# Patient Record
Sex: FEMALE | Race: WHITE
[De-identification: names, ages, dates, MRNs, and addresses within clinical notes are randomized per-mention and may not be internally consistent; named-entity substitution may affect disease eponyms.]

---

## 2019-01-07 ENCOUNTER — HOSPITAL ENCOUNTER (INPATIENT)
Dept: HOSPITAL 97 - ER | Age: 44
LOS: 4 days | Discharge: HOME HEALTH | DRG: 690 | End: 2019-01-11
Attending: FAMILY MEDICINE | Admitting: INTERNAL MEDICINE
Payer: COMMERCIAL

## 2019-01-07 VITALS — BODY MASS INDEX: 30.6 KG/M2

## 2019-01-07 DIAGNOSIS — Z16.12: ICD-10-CM

## 2019-01-07 DIAGNOSIS — E03.9: ICD-10-CM

## 2019-01-07 DIAGNOSIS — K21.9: ICD-10-CM

## 2019-01-07 DIAGNOSIS — F17.210: ICD-10-CM

## 2019-01-07 DIAGNOSIS — B96.20: ICD-10-CM

## 2019-01-07 DIAGNOSIS — N13.4: ICD-10-CM

## 2019-01-07 DIAGNOSIS — Z88.5: ICD-10-CM

## 2019-01-07 DIAGNOSIS — J30.9: ICD-10-CM

## 2019-01-07 DIAGNOSIS — N10: Primary | ICD-10-CM

## 2019-01-07 DIAGNOSIS — G43.909: ICD-10-CM

## 2019-01-07 LAB
BUN BLD-MCNC: 13 MG/DL (ref 7–18)
GLUCOSE SERPLBLD-MCNC: 115 MG/DL (ref 74–106)
HCT VFR BLD CALC: 44.4 % (ref 36–45)
LYMPHOCYTES # SPEC AUTO: 2.8 K/UL (ref 0.7–4.9)
PMV BLD: 9 FL (ref 7.6–11.3)
POTASSIUM SERPL-SCNC: 3.8 MMOL/L (ref 3.5–5.1)
RBC # BLD: 4.77 M/UL (ref 3.86–4.86)

## 2019-01-07 PROCEDURE — 81003 URINALYSIS AUTO W/O SCOPE: CPT

## 2019-01-07 PROCEDURE — 87088 URINE BACTERIA CULTURE: CPT

## 2019-01-07 PROCEDURE — 99285 EMERGENCY DEPT VISIT HI MDM: CPT

## 2019-01-07 PROCEDURE — 78708 K FLOW/FUNCT IMAGE W/DRUG: CPT

## 2019-01-07 PROCEDURE — 36415 COLL VENOUS BLD VENIPUNCTURE: CPT

## 2019-01-07 PROCEDURE — 96375 TX/PRO/DX INJ NEW DRUG ADDON: CPT

## 2019-01-07 PROCEDURE — 87077 CULTURE AEROBIC IDENTIFY: CPT

## 2019-01-07 PROCEDURE — 71045 X-RAY EXAM CHEST 1 VIEW: CPT

## 2019-01-07 PROCEDURE — 87086 URINE CULTURE/COLONY COUNT: CPT

## 2019-01-07 PROCEDURE — 81025 URINE PREGNANCY TEST: CPT

## 2019-01-07 PROCEDURE — 80076 HEPATIC FUNCTION PANEL: CPT

## 2019-01-07 PROCEDURE — 87186 SC STD MICRODIL/AGAR DIL: CPT

## 2019-01-07 PROCEDURE — 83735 ASSAY OF MAGNESIUM: CPT

## 2019-01-07 PROCEDURE — 81015 MICROSCOPIC EXAM OF URINE: CPT

## 2019-01-07 PROCEDURE — 85025 COMPLETE CBC W/AUTO DIFF WBC: CPT

## 2019-01-07 PROCEDURE — 83690 ASSAY OF LIPASE: CPT

## 2019-01-07 PROCEDURE — 74177 CT ABD & PELVIS W/CONTRAST: CPT

## 2019-01-07 PROCEDURE — 80048 BASIC METABOLIC PNL TOTAL CA: CPT

## 2019-01-07 PROCEDURE — 96374 THER/PROPH/DIAG INJ IV PUSH: CPT

## 2019-01-07 NOTE — XMS REPORT
Clinical Summary

 Created on:2019



Patient:Velma Juarez

Sex:Female

:1975

External Reference #:[id number]





Demographics







 Address    [address]



   [place] [address]

 

 Home Phone  [phone]

 

 Email Address  [email or web address]

 

 Preferred Language  English

 

 Marital Status  

 

 Faith Affiliation  Unknown

 

 Race  White

 

 Ethnic Group  Not  or 









Author







 Organization  [facility]

 

 Address  [address] [place] [address]









Support







 Name  Relationship  Address  Phone

 

 Solitario Juarez  Spouse     [phone]



     [place] [address]  









Care Team Providers







 Name  Role  Phone

 

 Asked, No Pcp  Primary Care Provider  Unavailable









Allergies

Not on File



Medications







 Medication  Sig  Dispensed  Refills  Start Date  End Date  Status

 

 cyclobenzaprine  Take 10 mg by    3  2016    Active



 (FLEXERIL) 10 mg tablet  mouth 3 (three)          



   times a day as          



   needed.          

 

 acetaminophen-codeine  Take 1 tablet    0  10/10/2016    Active



 (TYLENOL WITH CODEINE  by mouth every          



 #3) 300-30 mg per  6 (six) hours          



 tablet  as needed. for          



   pain          

 

 docusate sodium  1 CAPSULE AS    0  10/04/2016    Active



 (COLACE) 100 MG capsule  NEEDED ONCE A          



   DAY ORALLY 30          



   DAY(S)          

 

 HYDROcodone-acetaminoph  Take 1 tablet    0  2016    Active



 en (NORCO)  mg  by mouth every          



 per tablet  6 (six) hours          



   as needed. for          



   pain          

 

 omeprazole (PriLOSEC)  Take 40 mg by    0  10/10/2016    Active



 40 MG capsule  mouth once          



   daily.          

 

 QSYMIA 7.5-46 mg  Take 1 tablet    2  2016    Active



 capsule, ER multiphase  by mouth once          



 24 hr  daily.          

 

 tiZANidine (ZANAFLEX) 4  Take 4 mg by    0  10/10/2016    Active



 MG tablet  mouth every 12          



   (twelve) hours.          

 

 venlafaxine XR  Take 37.5 mg by    2  10/28/2016    Active



 (EFFEXOR-XR) 37.5 MG 24  mouth once          



 hr capsule  daily.          

 

 gabapentin (NEURONTIN)  TAKE 2 CAPSULES  200 capsule  1  2017    Active



 300 mg capsule  BY MOUTH 3          



   TIMES A DAY          







Active Problems







 Problem  Noted Date

 

 Brachial neuritis  2016

 

 Abnormal finding of trunk  2016

 

 Chronic bilateral low back pain without sciatica  2016

 

 Chronic pain syndrome  2016







Encounters







 Date  Type  Specialty  Care Team  Description

 

 2018  Refill  Neurosurgery  Jerzy Aburto MD  



after 2018



Social History







 Tobacco Use  Types  Packs/Day  Years Used  Date

 

 Never Assessed        









 Sex Assigned at Birth  Date Recorded

 

 Not on file  









 Job Start Date  Occupation  Industry

 

 Not on file  Not on file  Not on file









 Travel History  Travel Start  Travel End









 No recent travel history available.







Last Filed Vital Signs

Not on file



Plan of Treatment







 Health Maintenance  Due Date  Last Done  Comments

 

 CERVICAL CANCER SCREENING  10/15/1994    

 

 INFLUENZA VACCINE  2018    







Results

Not on fileafter 2018



Insurance







 Payer  Benefit Plan / Group  Subscriber ID  Type  Phone  Address

 

 [employer]  [employer] HMO,POS,EPO, [employer]  xxxxxxxxx  HMO    









 Guarantor Name  Account Type  Relation to  Date of Birth  Phone  Billing



     Patient      Address

 

 Velma Juarez  Personal/Family  Self  1975  [phone]  [phone] 







         (Home)  [place]



           [address]







Advance Directives

Patient has advance care planning documents on file. For more information, 
please contact:[place] [address]

## 2019-01-08 LAB
ALBUMIN SERPL BCP-MCNC: 4.4 G/DL (ref 3.4–5)
ALP SERPL-CCNC: 76 U/L (ref 45–117)
ALT SERPL W P-5'-P-CCNC: 65 U/L (ref 12–78)
AST SERPL W P-5'-P-CCNC: 85 U/L (ref 15–37)
LIPASE SERPL-CCNC: 81 U/L (ref 73–393)
UA COMPLETE W REFLEX CULTURE PNL UR: (no result)
UA COMPLETE W REFLEX CULTURE PNL UR: (no result)
UA DIPSTICK W REFLEX MICRO PNL UR: (no result)

## 2019-01-08 RX ADMIN — OXYCODONE AND ACETAMINOPHEN PRN TAB: 5; 325 TABLET ORAL at 21:24

## 2019-01-08 RX ADMIN — Medication SCH ML: at 09:01

## 2019-01-08 RX ADMIN — SODIUM CHLORIDE SCH: 0.9 INJECTION, SOLUTION INTRAVENOUS at 17:42

## 2019-01-08 RX ADMIN — SODIUM CHLORIDE SCH MLS: 0.9 INJECTION, SOLUTION INTRAVENOUS at 20:58

## 2019-01-08 RX ADMIN — ONDANSETRON PRN MG: 2 INJECTION INTRAMUSCULAR; INTRAVENOUS at 17:41

## 2019-01-08 RX ADMIN — Medication SCH ML: at 20:58

## 2019-01-08 RX ADMIN — ONDANSETRON PRN MG: 2 INJECTION INTRAMUSCULAR; INTRAVENOUS at 20:58

## 2019-01-08 RX ADMIN — TRAMADOL HYDROCHLORIDE PRN MG: 50 TABLET, COATED ORAL at 13:12

## 2019-01-08 RX ADMIN — TRAMADOL HYDROCHLORIDE PRN MG: 50 TABLET, COATED ORAL at 19:40

## 2019-01-08 RX ADMIN — LEVOFLOXACIN SCH MLS: 5 INJECTION, SOLUTION INTRAVENOUS at 08:54

## 2019-01-08 RX ADMIN — ONDANSETRON PRN MG: 2 INJECTION INTRAMUSCULAR; INTRAVENOUS at 10:43

## 2019-01-08 RX ADMIN — SODIUM CHLORIDE SCH MLS: 0.9 INJECTION, SOLUTION INTRAVENOUS at 08:54

## 2019-01-08 NOTE — P.HP
Certification for Inpatient


Patient admitted to: Inpatient


With expected LOS: >2 Midnights


Practitioner: I am a practitioner with admitting privileges, knowledge of 

patient current condition, hospital course, and medical plan of care.


Services: Services provided to patient in accordance with Admission 

requirements found in Title 42 Section 412.3 of the Code of Federal Regulations





Patient History


Date of Service: 01/08/19


Reason for admission: UTI, hydroureter


History of Present Illness: 





Ms Juarez is a 43 years old woman who start about 4 days ago with back pain, she 

went to chiropractic, but did not have relived. Then the pain localized in her 

right lower quadrant radiated to her right flank, 10/10 of intensity, colicky 

like. She denied fever or chills. She has never had this pain in the past. Lab 

work remarkable for leukocytosis, UA abnormal consistent with UTI. CT abdo/

pelis shows not obstruction or stones but right hydroureter. 


Allergies





No Known Drug Allergies Allergy (Unverified 09/18/15 17:14)


 Unknown


morphine Adverse Reaction (Mild, Verified 09/15/12 14:30)


 Nausea/Vomiting


NKDA Allergy (Uncoded 05/13/14 19:55)


 Unknown


No Known Nicolas Allergy (Uncoded 07/12/16 15:26)


 Unknown


No Known Allergie Allergy (Uncoded 02/06/17 07:10)


 Unknown





Home medications list reviewed: Yes


Home Medications: 








Docusate [Colace] 100 mg PO BID #60 cap 09/18/12 


Doxycycline [Vibramycin*] 100 mg PO Q12HR #28 tab 09/18/12 


Mupirocin Oint [Bactroban 2% Ointment] 22 appl TOP BID #1 tube 09/18/12 


Sulfamethoxazole/Trimethoprim [Bactrim Ds Tablet] 1 each PO Q12HR #28 tablet 09/ 18/12 


Tramadol HCl/Acetaminophen [Ultracet Tablet] 1 each PO Q4HP PRN #40 tablet 09/18 /12 








- Past Medical/Surgical History


-: migraines


Past Surgical History: Reviewed- Non-Contributory





- Family History


Family History: Reviewed- Non-Contributory





- Social History


Smoking Status: Current some day smoker


Counseled patient to stop smoking for: less than 10 minutes


Alcohol use: No


CD- Drugs: No


Caffeine use: Yes


Place of Residence: Home





Review of Systems


10-point ROS is otherwise unremarkable





Physical Examination





- Physical Exam


General: Alert, In no apparent distress


HEENT: Atraumatic, PERRLA, Mucous membr. moist/pink, EOMI, Sclerae nonicteric


Neck: Supple, 2+ carotid pulse no bruit, No LAD, Without JVD or thyroid 

abnormality


Respiratory: Clear to auscultation bilaterally, Normal air movement


Cardiovascular: Regular rate/rhythm, Normal S1 S2


Gastrointestinal: Normal bowel sounds, Tenderness (teneder to palpation right 

flank and right lowr quadrant)


Musculoskeletal: No tenderness


Integumentary: No rashes


Neurological: Normal speech, Normal strength at 5/5 x4 extr, Normal tone, 

Normal affect


Lymphatics: No axilla or inguinal lymphadenopathy





- Studies


Laboratory Data (last 24 hrs)





01/07/19 23:25: Total Bilirubin 0.3, AST 85 H, ALT 65, Alkaline Phosphatase 76, 

Lipase 81


01/07/19 23:25: Sodium 144, Potassium 3.8, BUN 13, Creatinine 0.84, Glucose 115 

H


01/07/19 23:25: WBC 13.0 H, Hgb 14.6, Hct 44.4, Plt Count 264








Assessment and Plan





- Problems (Diagnosis)


(1) UTI (urinary tract infection)


Current Visit: Yes   Status: Acute   


Qualifiers: 


   Urinary tract infection type: acute pyelonephritis   Qualified Code(s): N10 

- Acute pyelonephritis   





(2) Hydroureter


Current Visit: Yes   Status: Acute   





- Plan





Will admit the patient to start IV antibiotics, Urine culture in process. Will 

consult Dr Martins for evaluation and recommendations.





- Advance Directives


Does patient have a Living Will: No


Does patient have a Durable POA for Healthcare: No





- Code Status/Comfort Care


Code Status Assessed: Yes


Code Status: Full Code

## 2019-01-08 NOTE — ER
Nurse's Notes                                                                                     

 [facility]                                                                

Name: Velma Juarez                                                                                  

Age: 43 yrs                                                                                       

Sex: Female                                                                                       

: 1975                                                                                   

MRN: [id number]                                                                                   

Arrival Date: 2019                                                                          

Time: 23:03                                                                                       

Account#: [id number]                                                                            

Bed 24                                                                                            

Private MD:                                                                                       

Diagnosis: Acute tubulo-interstitial nephritis;Hydroureter;Cystitis;Vomiting;Abdominal tenderness 

                                                                                                  

Presentation:                                                                                     

                                                                                             

23:09 Presenting complaint: Patient states: I have had lower abd pain for a couple days with  la1 

      urinary frequency that has gotten worse and is coming around to the front now.              

      Transition of care: patient was not received from another setting of care. Onset of         

      symptoms was 2019. Risk Assessment: Do you want to hurt yourself or someone     

      else? Patient reports no desire to harm self or others. Initial Sepsis Screen: Does the     

      patient meet any 2 criteria? No. Patient's initial sepsis screen is negative. Does the      

      patient have a suspected source of infection? No. Patient's initial sepsis screen is        

      negative. Care prior to arrival: None.                                                      

23:09 Method Of Arrival: Ambulatory                                                           la1 

23:09 Acuity: MAGALIS 3                                                                           la1 

                                                                                                  

OB/GYN:                                                                                           

23:35 LMP N/A - Hysterectomy                                                                  la1 

                                                                                                  

Historical:                                                                                       

- Allergies:                                                                                      

23:10 No Known Allergies;                                                                     la1 

- Home Meds:                                                                                      

                                                                                             

06:35 omeprazole 20 mg Oral cpDR 1 cap once daily [Active]; Zanaflex Oral [Active];           bb  

      Tylenol-Codeine #4 300-60 mg Oral tab [Active]; claritan [Active]; tizanidine oral oral     

      [Active]; [place] Thyroid 30 mg Oral tab daily [Active]; tumeric [Active]; Vitamin C         

      Oral [Active]; Vitamin E Oral [Active]; Fish Oil oral oral [Active];                        

- PMHx:                                                                                           

                                                                                             

23:10 Migraines;                                                                              la1 

                                                                                             

06:35 Hypothyroidism; GERD;                                                                   bb  

- PSHx:                                                                                           

06:35 partical hysterectomy; Anterior lumbar fusion;                                          bb  

                                                                                                  

- Immunization history:: Adult Immunizations up to date.                                          

- Social history:: Smoking status: Patient/guardian denies using tobacco.                         

- Ebola Screening: : No symptoms or risks identified at this time.                                

                                                                                                  

                                                                                                  

Screenin/07                                                                                             

23:38 Abuse screen: Denies threats or abuse. Nutritional screening: No deficits noted.        la1 

      Tuberculosis screening: No symptoms or risk factors identified. Fall Risk None              

      identified.                                                                                 

                                                                                                  

Assessment:                                                                                       

23:36 General: Appears uncomfortable, Behavior is calm, cooperative. Pain: Complains of pain  la1 

      in posterior aspect of right lateral abdomen, anterior aspect of right lateral abdomen      

      and right lower quadrant. Neuro: Level of Consciousness is awake, alert, obeys              

      commands, Oriented to person, place, time, situation. Cardiovascular: Capillary refill      

      < 3 seconds Patient's skin is warm and dry. Respiratory: Airway is patent Respiratory       

      effort is even, unlabored, Respiratory pattern is regular, symmetrical, Breath sounds       

      are clear bilaterally. GI: Abdomen is round non-distended, Bowel sounds present X 4         

      quads. Abd is soft X 4 quads Abdomen is tender to palpation in right lower quadrant         

      Reports nausea, vomiting. : Reports urinary frequency.                                    

                                                                                             

00:29 Reassessment: Patient appears in no apparent distress at this time. Pt done with oral   la1 

      contrast, CT notified.                                                                      

00:47 Reassessment: Patient appears in no apparent distress at this time. No changes from     la1 

      previously documented assessment. Patient and/or family updated on plan of care and         

      expected duration. Pain level reassessed. Patient is alert, oriented x 3, equal             

      unlabored respirations, skin warm/dry/pink.                                                 

02:35 Reassessment: Patient and/or family updated on plan of care and expected duration. Pain bb  

      level reassessed. Patient is alert, oriented x 3, equal unlabored respirations, skin        

      warm/dry/pink. pt returned from CT scan via wheelchair with CT tech.                        

02:44 Reassessment: pt ambulated with steady gait to bathroom states pain is starting to come bb  

      back Dr Corona notified.                                                                  

03:48 Reassessment: Patient is alert, oriented x 3, equal unlabored respirations, skin        bb  

      warm/dry/pink. pt is awaiting CT scan.                                                      

04:50 Reassessment: Patient and/or family updated on plan of care and expected duration. Pain bb  

      level reassessed. Patient is alert, oriented x 3, equal unlabored respirations, skin        

      warm/dry/pink. pt instructed on need for admission and she verbalized understanding of      

      and agrees to plan of care awaiting for room assignment Patient states feeling better.      

06:04 Reassessment: Patient is alert, oriented x 3, equal unlabored respirations, skin        bb  

      warm/dry/pink. pt states pain is coming back but is only 4/10 now IV site intact, no        

      erythema or edema noted awaiting room assignment.                                           

07:19 Reassessment: Pt is resting at this time. Eyes closed, respirations remain even and     ss  

      unlabored. Awaiting for 730 to call report per facility policy. VS WNL.                    

07:33 Reassessment: report given to JORGE A Langfrod.                                                ss  

                                                                                                  

Vital Signs:                                                                                      

                                                                                             

23:35  / 64; Pulse 84; Resp 16; Temp 98.7; Pulse Ox 98% on R/A; Weight 79.38 kg; Height la1 

      5 ft. 5 in. (165.10 cm);                                                                    

                                                                                             

00:46  / 63; Pulse 70; Resp 18; Pulse Ox 98% on R/A;                                    la1 

02:44  / 59; Pulse 71; Resp 16 S; Temp 98.3(O); Pulse Ox 99% on R/A; Pain 5/10;         bb  

04:50  / 62; Pulse 66; Resp 16 S; Pulse Ox 100% on R/A; Pain 2/10;                      bb  

06:06 BP 99 / 54; Pulse 70; Resp 14 S; Pulse Ox 97% on R/A; Pain 4/10;                        bb  

                                                                                             

23:35 Body Mass Index 29.12 (79.38 kg, 165.10 cm)                                             la1 

                                                                                                  

ED Course:                                                                                        

                                                                                             

23:03 Patient arrived in ED.                                                                  es  

23:07 [facility]                                                        kb  

23:07 Tremaine Corona MD is Attending Physician.                                             kb  

23:09 Johnnie Georges, RN is Primary Nurse.                                                       la1 

23:10 Triage completed.                                                                       la1 

23:10 Arm band placed on left wrist.                                                          la1 

23:37 Call light in reach.                                                                    la1 

23:37 Inserted saline lock: 22 gauge in right antecubital area, using aseptic technique.      la1 

      Blood collected.                                                                            

                                                                                             

02:16 Patient moved to CT via wheelchair.                                                     kw1 

02:27 CT completed. Patient tolerated procedure well. Patient moved back from CT.             kw1 

02:42 CT Abd/Pelvis - W/Contrast In Process Unspecified.                                      EDMS

02:43 CT completed. Patient tolerated procedure well. Patient moved back from CT.             kw1 

02:45 IV is patent, is intact.                                                                bb  

04:45 Ashley Mistry MD is Hospitalizing Provider.                                          jermaine 

04:51 No provider procedures requiring assistance completed. Patient admitted, IV remains in  bb  

      place.                                                                                      

                                                                                                  

Administered Medications:                                                                         

                                                                                             

23:34 Drug: NS 0.9% 1000 ml Route: IV; Rate: 1000 ml; Site: right antecubital;                la1 

23:54 Follow up: IV Status: Completed infusion                                                la1 

23:34 Drug: morphine 4 mg Route: IVP; Site: right antecubital;                                la1 

23:53 Follow up: Response: No adverse reaction; Pain is decreased                             la1 

23:35 Drug: Zofran 4 mg Route: IVP; Site: right antecubital;                                  la1 

23:54 Follow up: Response: No adverse reaction                                                la1 

23:53 Drug: fentaNYL (PF) 50 mcg Route: IVP; Site: right antecubital;                         la1 

                                                                                             

00:07 Follow up: Response: No adverse reaction; Pain is decreased                             la1 

00:00 Drug: TORadol 30 mg Route: IVP; Site: right antecubital;                                la1 

00:07 Follow up: Response: No adverse reaction; Pain is decreased                             la1 

03:15 Drug: fentaNYL (PF) 50 mcg Route: IVP; Site: right antecubital;                         bb  

04:14 Follow up: Response: Pain is decreased                                                  bb  

04:40 Not Given (Other Intervention Used): Rocephin - (cefTRIAXone) 1 grams IVPB once over 30 bb  

      mins; (mix in 50 mL NS)                                                                     

04:41 Drug: Rocephin 1 grams Route: IV; Rate: calculated rate; Site: right antecubital;       bb  

04:46 Follow up: IV Status: Completed infusion; IV Intake: 10ml                               bb  

04:49 Drug: Flomax 0.4 mg Route: PO;                                                          bb  

05:50 Follow up: Response: No adverse reaction                                                bb  

                                                                                                  

                                                                                                  

Intake:                                                                                           

04:46 IV: 10ml; Total: 10ml.                                                                  bb  

                                                                                                  

Outcome:                                                                                          

04:47 Decision to Hospitalize by Provider.                                                    jermaine 

04:52 Instructed on the need for admit.                                                       bb  

07:44 Admitted to Med/surg accompanied by tech, family with patient, via wheelchair, with     ss  

      chart, Report called to  JORGE A Langford                                                          

07:44 Condition: good                                                                             

07:45 Patient left the ED.                                                                    ss  

                                                                                                  

Signatures:                                                                                       

Dispatcher [employer]                           Jayla Swanson, KATIA-C                 MOISÉSP-Tremaine Doe MD MD cha Salyer, Edna es                                                   

Kirk, Sejal, RN                     RN   bb                                                   

Silvia Ledesma RN                      RN   ss                                                   

Johnnie Georges RN                         RN   la                                                  

Elidia Huang                                                  

                                                                                                  

**************************************************************************************************

## 2019-01-08 NOTE — CON
History Of Present Illness:  This is a pleasant 43-year-old woman who started 
to have back pains 4 days ago with some radiation to the right lower quadrant.  
She thought she had a UTI.  She normally has about 1 UTI per year.  She tried 
treating with AZO Standard but did not improve.  When it got worse, she went to 
a chiropractor.  Did some adjustments but adjustments did not work either, so 
she came to the emergency room.  She denies ever having any previous kidney 
stones.  The pain was, at its height, it was 20/10, now it is a 5/10.  She has 
no appetite.  She is still nauseous.  Internally, she feels like she has a 
temperature but her temperature is normal when it is checked.  She had previous 
area in the lumbar area, L4, L5, S1 area and this pain does not feel like that 
type of pain.  On the CAT scan, her bladder was full, so will need to check a 
postvoid residual.  Also reviewed the CAT scan showing proximal hydroureter and 
distal hydroureter on the right as if she recently passed a stone.  UA shows 3+ 
blood, 3+ leukocyte esterase, greater than 50 rbc's, greater than 50 wbc's, 
squamous cells less than 5, bacteria 20-50.  Pregnancy test is negative.  Total 
protein 2+.



Allergies:  TO MORPHINE, CAUSES NAUSEA AND VOMITING.



Home Medications:  Colace, Vibramycin, Bactroban ointment, Bactrim DS 1 tab 
q.12 hours, tramadol.



Past Medical History:  Migraines.



Past Surgical History:  Back surgery as mentioned above.



Family History:  Noncontributory.



Social History:  Current smoker.  We counseled her to stop.  Alcohol none.  
Drugs none.  Caffeine use, yes.  She resides at home.



Review of Systems:

A 10-point review of systems otherwise unremarkable.



Physical Examination:

She is alert and oriented. 

HEENT:  Atraumatic, normocephalic. 

Neck:  Supple. 

Respiratory:  Clear to auscultation. 

Cardiovascular:  Normal S1, S2. 

Gastrointestinal:  Normal bowel sounds.  Tenderness. 

Musculoskeletal:  No tenderness. 

Skin:  No rashes. 

Neurologic:  Normal speech.  Normal strength.  Normal affect.  

Lymphatics:  Normal.



Laboratory Data:  UA as mentioned above.  Chemistry shows sodium 144, potassium 
3.9, chloride 107, carbon dioxide 28, BUN 13, creatinine 0.84, GFR 73, glucose 
115, calcium 9.2, total bilirubin 0.3, direct bilirubin 0.1, AST 85, ALT 65, 
alkaline phosphatase 76.  Hematology; white count elevated 13.0, H and H 14.6 
and 44.4, platelet count 264.



Assessment:  Right flank pain, proximal right hydroureter and distal 
hydroureter.  Urinalysis appears to resemble urinary tract infection picture.  
She does have some pain.  She may have some pyelonephritis going on.  She may 
have a distended bladder.



Plan:  Plan is to go ahead and check postvoid residual.  We will also go ahead 
and check renal scan in the morning to assess for any obstructions that may be 
in the right ureter.  I recommend continuing antibiotics, continue to check for 
final results on the culture, which may possible have a UTI or possible 
something may be obstructing the ureter.  I will follow her with you.





SHAWN

DD:  01/08/2019 12:31:55   Voice ID:  [id number]

DT:  01/08/2019 17:41:09   Report ID:  [id number]

KATY

## 2019-01-08 NOTE — EDPHYS
Physician Documentation                                                                           

 [facility]                                                                

Name: Velma Juarez                                                                                  

Age: 43 yrs                                                                                       

Sex: Female                                                                                       

: 1975                                                                                   

MRN: [id number]                                                                                   

Arrival Date: 2019                                                                          

Time: 23:03                                                                                       

Account#: [id number]                                                                            

Bed 24                                                                                            

Private MD:                                                                                       

ED Physician Tremaine Corona                                                                      

HPI:                                                                                              

                                                                                             

23:46 This 43 yrs old  Female presents to ER via Ambulatory with complaints of Flank kb  

      Pain, Back Pain, Nausea/Vomiting.                                                           

23:46 The patient complains of pain in the right flank. The pain radiates to the right lower  kb  

      quadrant. Onset: The symptoms/episode began/occurred 3 day(s) ago. Modifying factors:       

      The symptoms are alleviated by nothing. the symptoms are aggravated by                      

      palpation/percussion. Associated signs and symptoms: Pertinent positives: nausea,           

      vomiting, Pertinent negatives: diarrhea, dizziness, dysuria, fever, urinary frequency,      

      headache, hematuria, pain radiating to the lower extremities. Severity of pain: At its      

      worst the pain was moderate severe in the emergency department the pain is unchanged.       

      The patient has not experienced similar symptoms in the past. The patient has not           

      recently seen a physician.                                                                  

                                                                                                  

OB/GYN:                                                                                           

23:35 LMP N/A - Hysterectomy                                                                  la1 

                                                                                                  

Historical:                                                                                       

- Allergies:                                                                                      

23:10 No Known Allergies;                                                                     la1 

- Home Meds:                                                                                      

                                                                                             

06:35 omeprazole 20 mg Oral cpDR 1 cap once daily [Active]; Zanaflex Oral [Active];           bb  

      Tylenol-Codeine #4 300-60 mg Oral tab [Active]; claritan [Active]; tizanidine oral oral     

      [Active]; Red House Thyroid 30 mg Oral tab daily [Active]; tumeric [Active]; Vitamin C         

      Oral [Active]; Vitamin E Oral [Active]; Fish Oil oral oral [Active];                        

- PMHx:                                                                                           

                                                                                             

23:10 Migraines;                                                                              la1 

                                                                                             

06:35 Hypothyroidism; GERD;                                                                   bb  

- PSHx:                                                                                           

06:35 partical hysterectomy; Anterior lumbar fusion;                                          bb  

                                                                                                  

- Immunization history:: Adult Immunizations up to date.                                          

- Social history:: Smoking status: Patient/guardian denies using tobacco.                         

- Ebola Screening: : No symptoms or risks identified at this time.                                

                                                                                                  

                                                                                                  

ROS:                                                                                              

                                                                                             

23:47 Constitutional: Negative for fever, chills, and weight loss, ENT: Negative for injury,  kb  

      pain, and discharge, Neck: Negative for injury, pain, and swelling, Cardiovascular:         

      Negative for chest pain, palpitations, and edema, Respiratory: Negative for shortness       

      of breath, cough, wheezing, and pleuritic chest pain, MS/Extremity: Negative for injury     

      and deformity, Skin: Negative for injury, rash, and discoloration, Neuro: Negative for      

      headache, weakness, numbness, tingling, and seizure.                                        

      Abdomen/GI: Positive for abdominal pain, nausea and vomiting.                               

      : Positive for flank pain.                                                                

                                                                                                  

Exam:                                                                                             

23:50 Head/Face:  Normocephalic, atraumatic. ENT:  Nares patent. No nasal discharge, no       kb  

      septal abnormalities noted.  Tympanic membranes are normal and external auditory canals     

      are clear.  Oropharynx with no redness, swelling, or masses, exudates, or evidence of       

      obstruction, uvula midline.  Mucous membranes moist. Neck:  Trachea midline, no             

      thyromegaly or masses palpated, and no cervical lymphadenopathy.  Supple, full range of     

      motion without nuchal rigidity, or vertebral point tenderness.  No Meningismus.             

      Chest/axilla:  Normal chest wall appearance and motion.  Nontender with no deformity.       

      No lesions are appreciated. Cardiovascular:  Regular rate and rhythm with a normal S1       

      and S2.  No gallops, murmurs, or rubs.  Normal PMI, no JVD.  No pulse deficits.             

      Respiratory:  Lungs have equal breath sounds bilaterally, clear to auscultation and         

      percussion.  No rales, rhonchi or wheezes noted.  No increased work of breathing, no        

      retractions or nasal flaring. Skin:  Warm, dry with normal turgor.  Normal color with       

      no rashes, no lesions, and no evidence of cellulitis. MS/ Extremity:  Pulses equal, no      

      cyanosis.  Neurovascular intact.  Full, normal range of motion. Neuro:  Awake and           

      alert, GCS 15, oriented to person, place, time, and situation.  Cranial nerves II-XII       

      grossly intact.  Motor strength 5/5 in all extremities.  Sensory grossly intact.            

      Cerebellar exam normal.  Normal gait.                                                       

23:50 Constitutional: The patient appears alert, awake, in obvious pain.                          

23:50 Abdomen/GI: Inspection: abdomen appears normal, Bowel sounds: normal, in all quadrants,     

      Palpation: soft, in all quadrants, moderate abdominal tenderness, in the right lower        

      quadrant.                                                                                   

23:50 Back: CVA tenderness, that is moderate, is noted on the right.                              

                                                                                                  

Vital Signs:                                                                                      

23:35  / 64; Pulse 84; Resp 16; Temp 98.7; Pulse Ox 98% on R/A; Weight 79.38 kg; Height la1 

      5 ft. 5 in. (165.10 cm);                                                                    

                                                                                             

00:46  / 63; Pulse 70; Resp 18; Pulse Ox 98% on R/A;                                    la1 

02:44  / 59; Pulse 71; Resp 16 S; Temp 98.3(O); Pulse Ox 99% on R/A; Pain 5/10;         bb  

04:50  / 62; Pulse 66; Resp 16 S; Pulse Ox 100% on R/A; Pain 2/10;                      bb  

06:06 BP 99 / 54; Pulse 70; Resp 14 S; Pulse Ox 97% on R/A; Pain 4/10;                        bb  

                                                                                             

23:35 Body Mass Index 29.12 (79.38 kg, 165.10 cm)                                             la1 

                                                                                                  

MDM:                                                                                              

                                                                                             

23:08 Patient medically screened.                                                             kb  

23:45 Data reviewed: vital signs, nurses notes. Data interpreted: Pulse oximetry: on room air kb  

      is 98 %. Interpretation: normal.                                                            

                                                                                                  

                                                                                             

23:18 Order name: CBC with Diff; Complete Time: 23:36                                         kb  

                                                                                             

23:18 Order name: Basic Metabolic Panel; Complete Time: 23:51                                 kb  

                                                                                             

23:18 Order name: Urine Microscopic Only; Complete Time: 15:05                                kb  

                                                                                             

04:00 Order name: LFT's; Complete Time: 04:20                                                 [facility] 

                                                                                             

04:00 Order name: Lipase; Complete Time: 04:20                                                [facility] 

                                                                                             

04:21 Order name: Urine Culture                                                               [facility] 

                                                                                             

23:36 Order name: CT Abd/Pelvis - W/Contrast; Complete Time: 15:05                            kb  

                                                                                             

04:25 Order name: Urine Dipstick--Ancillary (enter results); Complete Time: 04:39             ag4 

                                                                                             

04:25 Order name: Urine Pregnancy--Ancillary (enter results); Complete Time: 04:39            ag4 

                                                                                             

23:18 Order name: IV Start; Complete Time: 23:36                                              kb  

                                                                                             

23:18 Order name: Urine Pregnancy Test (obtain specimen); Complete Time: 23:36                kb  

                                                                                             

23:18 Order name: Urine Dipstick-Ancillary (obtain specimen); Complete Time: 23:53            kb  

                                                                                                  

Administered Medications:                                                                         

23:34 Drug: NS 0.9% 1000 ml Route: IV; Rate: 1000 ml; Site: right antecubital;                la1 

23:54 Follow up: IV Status: Completed infusion                                                la1 

23:34 Drug: morphine 4 mg Route: IVP; Site: right antecubital;                                la1 

23:53 Follow up: Response: No adverse reaction; Pain is decreased                             la1 

23:35 Drug: Zofran 4 mg Route: IVP; Site: right antecubital;                                  la1 

23:54 Follow up: Response: No adverse reaction                                                la1 

23:53 Drug: fentaNYL (PF) 50 mcg Route: IVP; Site: right antecubital;                         la1 

                                                                                             

00:07 Follow up: Response: No adverse reaction; Pain is decreased                             la1 

00:00 Drug: TORadol 30 mg Route: IVP; Site: right antecubital;                                la1 

00:07 Follow up: Response: No adverse reaction; Pain is decreased                             la1 

03:15 Drug: fentaNYL (PF) 50 mcg Route: IVP; Site: right antecubital;                         bb  

04:14 Follow up: Response: Pain is decreased                                                  bb  

04:40 Not Given (Other Intervention Used): Rocephin - (cefTRIAXone) 1 grams IVPB once over 30 bb  

      mins; (mix in 50 mL NS)                                                                     

04:41 Drug: Rocephin 1 grams Route: IV; Rate: calculated rate; Site: right antecubital;       bb  

04:46 Follow up: IV Status: Completed infusion; IV Intake: 10ml                               bb  

04:49 Drug: Flomax 0.4 mg Route: PO;                                                          bb  

05:50 Follow up: Response: No adverse reaction                                                bb  

                                                                                                  

                                                                                                  

Disposition:                                                                                      

04:00 Co-signature as Attending Physician, Tremaine Corona MD I agree with the assessment and  jermaine 

      plan of care.                                                                               

                                                                                                  

Disposition:                                                                                      

19 04:47 Hospitalization ordered by Ashley Mistry for Inpatient Admission. Preliminary    

  diagnosis are Acute tubulo-interstitial nephritis, Hydroureter, Cystitis,                       

  Vomiting, Abdominal tenderness.                                                                 

- Bed requested for Telemetry/MedSurg (observation).                                              

- Status is Inpatient Admission.                                                              ss  

- Condition is Fair.                                                                              

- Problem is new.                                                                                 

- Symptoms have improved.                                                                         

UTI on Admission? Yes                                                                             

                                                                                                  

                                                                                                  

                                                                                                  

Signatures:                                                                                       

Dispatcher [employer]                           Jayla Swanson FNP-C FNP-Ckb                                                   

Marcia Barger RN RN mw Anderson, Corey, MD MD cha Ballard, Brenda, RN RN bb Smirch, Shelby, RN RN ss Attema, Lee, RN RN   la1                                                  

                                                                                                  

Corrections: (The following items were deleted from the chart)                                    

 02:50 Head/Face: Normocephalic, atraumatic. ENT: Nares patent. No nasal discharge, no   kb  

      septal abnormalities noted. Tympanic membranes are normal and external auditory canals      

      are clear. Oropharynx with no redness, swelling, or masses, exudates, or evidence of        

      obstruction, uvula midline. Mucous membranes moist. Neck: Trachea midline, no               

      thyromegaly or masses palpated, and no cervical lymphadenopathy. Supple, full range of      

      motion without nuchal rigidity, or vertebral point tenderness. No Meningismus.              

      Chest/axilla: Normal chest wall appearance and motion. Nontender with no deformity. No      

      lesions are appreciated. Cardiovascular: Regular rate and rhythm with a normal S1 and       

      S2. No gallops, murmurs, or rubs. Normal PMI, no JVD. No pulse deficits. Respiratory:       

      Lungs have equal breath sounds bilaterally, clear to auscultation and percussion. No        

      rales, rhonchi or wheezes noted. No increased work of breathing, no retractions or          

      nasal flaring. Skin: Warm, dry with normal turgor. Normal color with no rashes, no          

      lesions, and no evidence of cellulitis. MS/ Extremity: Pulses equal, no cyanosis.           

      Neurovascular intact. Full, normal range of motion. Neuro: Awake and alert, GCS 15,         

      oriented to person, place, time, and situation. Cranial nerves II-XII grossly intact.       

      Motor strength 5/5 in all extremities. Sensory grossly intact. Cerebellar exam normal.      

      Normal gait. kb                                                                             

 02:50 Abdomen/GI: Inspection: abdomen appears normal, Bowel sounds: normal, in all      kb  

      quadrants, Palpation: soft, in all quadrants, moderate abdominal tenderness, in the         

      right lower quadrant, kb                                                                    

 02:50 Back: CVA tenderness, that is moderate, is noted on the right, kb                 kb  

 02:50 Constitutional: The patient appears alert, awake, in obvious pain, kb             kb  

05:24 04:47 Hospitalization Ordered by Ashley Mistry MD for Inpatient Admission. Preliminary   

      diagnosis is Acute tubulo-interstitial nephritis; Hydroureter; Cystitis; Vomiting;          

      Abdominal tenderness. Bed requested for Telemetry/MedSurg (observation). Status is          

      Inpatient Admission. Condition is Fair. Problem is new. Symptoms have improved. UTI on      

      Admission? Yes. [facility]                                                                         

07:45 05:24 2019 04:47 Hospitalization Ordered by Ashley Mistry MD for Inpatient       ss  

      Admission. Preliminary diagnosis is Acute tubulo-interstitial nephritis; Hydroureter;       

      Cystitis; Vomiting; Abdominal tenderness. Bed requested for Telemetry/MedSurg               

      (observation). Status is Inpatient Admission. Condition is Fair. Problem is new.            

      Symptoms have improved. UTI on Admission? Yes. mw                                           

                                                                                                  

**************************************************************************************************

## 2019-01-08 NOTE — RAD REPORT
EXAM DESCRIPTION:  CT - Abdomen   Pelvis W Contrast - 1/8/2019 5:53 am

 

CLINICAL HISTORY:   Abdominal pain. Lower abdominal pain with dysuria

 

COMPARISON:  2016

 

TECHNIQUE:  Computed axial tomography of the abdomen and pelvis was obtained. 100 cc Isovue-300 is ad
ministered intravenously. Oral contrast was given.Preliminary report generated by [employer] 
and reviewed prior to dictation

 

All CT scans are performed using dose optimization technique as appropriate and may include automated
 exposure control or mA/KV adjustment according to patient size.

 

FINDINGS:

The liver, spleen, pancreas, adrenals and kidneys appear unremarkable. Enhancement of the wall of the
 right ureter.

 

The appendix is not visualized. There is no evidence of diverticulitis

 

Postsurgical changes involve the lumbar spine moderate amount of stool within the colon

 

IMPRESSION:  Enhancement of the wall of the right ureter is a nonspecific finding but can be related 
to inflammation/ infection

 

Moderate amount of stool within the colon

## 2019-01-09 LAB
BUN BLD-MCNC: 6 MG/DL (ref 7–18)
GLUCOSE SERPLBLD-MCNC: 91 MG/DL (ref 74–106)
HCT VFR BLD CALC: 42 % (ref 36–45)
LYMPHOCYTES # SPEC AUTO: 1.9 K/UL (ref 0.7–4.9)
MAGNESIUM SERPL-MCNC: 2.1 MG/DL (ref 1.8–2.4)
PMV BLD: 9.6 FL (ref 7.6–11.3)
POTASSIUM SERPL-SCNC: 4 MMOL/L (ref 3.5–5.1)
RBC # BLD: 4.57 M/UL (ref 3.86–4.86)

## 2019-01-09 RX ADMIN — OXYCODONE AND ACETAMINOPHEN PRN TAB: 5; 325 TABLET ORAL at 19:58

## 2019-01-09 RX ADMIN — Medication SCH: at 19:59

## 2019-01-09 RX ADMIN — ONDANSETRON PRN MG: 2 INJECTION INTRAMUSCULAR; INTRAVENOUS at 08:29

## 2019-01-09 RX ADMIN — SODIUM CHLORIDE SCH MLS: 0.45 INJECTION, SOLUTION INTRAVENOUS at 17:16

## 2019-01-09 RX ADMIN — OXYCODONE AND ACETAMINOPHEN PRN TAB: 5; 325 TABLET ORAL at 10:16

## 2019-01-09 RX ADMIN — OXYCODONE AND ACETAMINOPHEN PRN TAB: 5; 325 TABLET ORAL at 15:09

## 2019-01-09 RX ADMIN — LEVOFLOXACIN SCH MLS: 5 INJECTION, SOLUTION INTRAVENOUS at 08:29

## 2019-01-09 RX ADMIN — SODIUM CHLORIDE SCH MLS: 0.9 INJECTION, SOLUTION INTRAVENOUS at 15:04

## 2019-01-09 RX ADMIN — Medication SCH: at 08:34

## 2019-01-09 RX ADMIN — OXYCODONE AND ACETAMINOPHEN PRN TAB: 5; 325 TABLET ORAL at 04:41

## 2019-01-09 NOTE — P.PN
Subjective


Date of Service: 01/09/19


Primary Care Provider: JAYANT varela


Chief Complaint: UTI, hydroureter


Subjective: Improving





Physical Examination





- Vital Signs


Temperature: 98.9 F


Blood Pressure: 96/51


Pulse: 72


Respirations: 16


Pulse Ox (%): 97





- Physical Exam


General: Alert, In no apparent distress, Oriented x3, Cooperative


HEENT: Atraumatic


Neck: Supple


Respiratory: Clear to auscultation bilaterally, Normal air movement


Cardiovascular: Normal pulses, Regular rate/rhythm


Gastrointestinal: Normal bowel sounds, Soft and benign, Non-distended, No masses

, No rebound, No guarding, Tenderness (Right flank pain improved)


Musculoskeletal: No erythema, No tenderness, No warmth


Integumentary: No tenderness/swelling, No erythema, No warmth, No cyanosis


Neurological: Normal speech, Normal strength at 5/5 x4 extr, Normal tone





- Studies


Medications List Reviewed: Yes





Assessment & Plan


Discharge Plan: Home


Plan to discharge in: 24 Hours


Physician Review Additional Text: 





Impression: 


Right Pyelonephritis without obstruction





Plan:


Patient doing well this time.  Patient with positive urine culture with Gram 

negative rods.  Await urine culture results.  Possible discharge later today if 

culture finalized.  Continue with medication.  Advance diet.  Encourage 

ambulation.  Will discuss with urology.


Time Spent Managing Pts Care (In Minutes): 55

## 2019-01-09 NOTE — RAD REPORT
EXAM DESCRIPTION:  NM - Kidney Imag W/Flow F W - 1/9/2019 6:40 am

 

CLINICAL HISTORY:  Right-sided obstruction

 

COMPARISON:  CT imaging January 8

 

TECHNIQUE:  Patient was administered 10.5 millicuries technetium 99 M Mag 3. A 40 milligram Lasix was
 given approximately 10 minutes after radiopharmaceutical administration.

 

FINDINGS:  Dynamic flow imaging was performed. Split function assessment was calculated. Time activit
y curves were generated.

 

Both kidneys show similar time activity curves. Time to peak activity on the left was 4.30 minutes. T
madelyn to peak activity on the right was 4.35 minutes. Time activity curves are normal. Split function i
s 46% left and 54% right.

 

IMPRESSION:  Normal Lasix renogram studies. Both kidneys demonstrate normal, symmetric time activity 
curves.

 

Time to peak activity on the left was 4.30 minutes. Time to peak activity on the right 4.35 minutes.

 

Split function was 46% left and 54% right.

## 2019-01-09 NOTE — PN
Subjective:  The patient's spouse is present in the room and today she is feeling a little bit better
.  She has some nausea.  Her renal scan was done this morning.



Objective:  Her white count is down from 13,000 to 7800.  Her GFR is up from 73% to 86%.  Her urine c
ulture is growing gram-negative rods.  She is currently on Levaquin.  Her renal nuclear scan shows th
at both kidneys are functioning well.  Good uptake and excretion.  No signs of obstruction.  Function
 was almost even.



Assessment:  Urinary tract infection, right hydroureter, possible a tinge of right pyelonephritis, ex
plaining her pain.  Continue Levaquin.  Await final urine culture and sensitivity, we will base her a
ntibiotics off this.  I would recommend at least 2 weeks of antibiotics at home for her since she may
 have possibility of pyelonephritis.





CHARLIE/KEV

DD:  01/09/2019 13:14:45Voice ID:  [id number]

DT:  01/09/2019 18:21:45Report ID:  [id number]

## 2019-01-10 PROCEDURE — 02HV33Z INSERTION OF INFUSION DEVICE INTO SUPERIOR VENA CAVA, PERCUTANEOUS APPROACH: ICD-10-PCS

## 2019-01-10 PROCEDURE — B548ZZA ULTRASONOGRAPHY OF SUPERIOR VENA CAVA, GUIDANCE: ICD-10-PCS

## 2019-01-10 RX ADMIN — SODIUM CHLORIDE SCH MLS: 9 INJECTION, SOLUTION INTRAVENOUS at 20:35

## 2019-01-10 RX ADMIN — SODIUM CHLORIDE SCH MLS: 9 INJECTION, SOLUTION INTRAVENOUS at 09:32

## 2019-01-10 RX ADMIN — OXYCODONE AND ACETAMINOPHEN PRN TAB: 5; 325 TABLET ORAL at 22:47

## 2019-01-10 RX ADMIN — OXYCODONE AND ACETAMINOPHEN PRN TAB: 5; 325 TABLET ORAL at 12:35

## 2019-01-10 RX ADMIN — SODIUM CHLORIDE SCH MLS: 0.45 INJECTION, SOLUTION INTRAVENOUS at 13:00

## 2019-01-10 RX ADMIN — SODIUM CHLORIDE SCH: 0.45 INJECTION, SOLUTION INTRAVENOUS at 03:00

## 2019-01-10 RX ADMIN — Medication SCH ML: at 20:35

## 2019-01-10 RX ADMIN — SODIUM CHLORIDE SCH MLS: 0.45 INJECTION, SOLUTION INTRAVENOUS at 05:36

## 2019-01-10 RX ADMIN — OXYCODONE AND ACETAMINOPHEN PRN TAB: 5; 325 TABLET ORAL at 05:35

## 2019-01-10 RX ADMIN — ONDANSETRON PRN MG: 2 INJECTION INTRAMUSCULAR; INTRAVENOUS at 03:08

## 2019-01-10 RX ADMIN — Medication SCH: at 09:00

## 2019-01-10 RX ADMIN — ONDANSETRON PRN MG: 2 INJECTION INTRAMUSCULAR; INTRAVENOUS at 22:47

## 2019-01-10 NOTE — PN
Subjective:  The patient is doing well.



Objective:  Urine culture came back as E. coli ESBL.  Sensitivity shows to 
Bactrim, tetracycline, nitrofurantoin, Augmentin, gentamicin, cefoxitin, 
cefotetan, Unasyn, Timentin, tobramycin, piptazobactam, ciprofloxacin, meropenem
, amikacin.  The patient's vital signs are stable.



Laboratory Data:  No new labs today.



Assessment:  Extended-spectrum beta-lactamase, right pyelonephritis.  Recommend 
14 days of IV meropenem twice a day.  This patient is healthy.  She has a nice 
place to live, new six pascual, and she is willing to learn how to do one dose 
of the antibiotics a day by herself, and the home health nurse does it once a 
day also for a total of twice a day.  I have asked the nurses to teach her how 
to do that.  I believe it would be safe to send her home.  She is very 
competent and is interested in learning how to do so and very motivated to go 
home. 



Thank you very much for this consultation.





SHAWN

DD:  01/10/2019 16:42:32   Voice ID:  [id number]

DT:  01/10/2019 22:22:12   Report ID:  [id number]

KATY

## 2019-01-10 NOTE — P.PN
Subjective


Date of Service: 01/10/19


Primary Care Provider: JAYANT varela


Chief Complaint: UTI, hydroureter


Subjective: Improving (Still with mild pain to the right flank)





Physical Examination





- Vital Signs


Temperature: 98.2 F


Blood Pressure: 111/81


Pulse: 69


Respirations: 17


Pulse Ox (%): 100





- Physical Exam


General: Alert, In no apparent distress, Oriented x3, Cooperative


HEENT: Atraumatic


Neck: Supple


Respiratory: Clear to auscultation bilaterally, Normal air movement


Cardiovascular: Normal pulses, Regular rate/rhythm


Gastrointestinal: Normal bowel sounds, Soft and benign, Non-distended, No masses

, No rebound, No guarding, Tenderness (Pain to the right flank improved)


Musculoskeletal: No erythema, No tenderness, No warmth


Integumentary: No tenderness/swelling, No erythema, No warmth, No cyanosis


Neurological: Normal speech, Normal strength at 5/5 x4 extr, Normal tone, 

Normal affect





- Studies


Microbiology Data (last 24 hrs): 








01/08/19 04:05   Clean Catch Urine   [place] Count - Final


                            >100,000 CFU/ML.


01/08/19 04:05   Clean Catch Urine    - Final


                            Escherichia Coli





Medications List Reviewed: Yes





Assessment & Plan


Discharge Plan: Other (Home with IV antibiotic therapy verses long-term acute 

care facility)


Plan to discharge in: 24 Hours


Physician Review Additional Text: 





Impression: 


Right Pyelonephritis without obstruction with positive urine culture for E coli-

ESBL





Plan:


Right Pyelonephritis without obstruction with positive urine culture for E coli-

ESBL: Patient doing better at this time.  IV antibiotic changed to meropenem 

1000 mg twice daily.  Plan of care discussed with patient.  Will need to 

consider home with IV antibiotic therapy for 2 weeks verses long-term acute 

care facility placement.  Will discuss with  to see what options 

are available.  PICC line ordered.  Will discuss further with urology.


Time Spent Managing Pts Care (In Minutes): 55

## 2019-01-11 VITALS — TEMPERATURE: 97 F | SYSTOLIC BLOOD PRESSURE: 112 MMHG | DIASTOLIC BLOOD PRESSURE: 86 MMHG

## 2019-01-11 VITALS — OXYGEN SATURATION: 91 %

## 2019-01-11 LAB
BUN BLD-MCNC: 10 MG/DL (ref 7–18)
GLUCOSE SERPLBLD-MCNC: 90 MG/DL (ref 74–106)
MAGNESIUM SERPL-MCNC: 2.3 MG/DL (ref 1.8–2.4)
POTASSIUM SERPL-SCNC: 4.1 MMOL/L (ref 3.5–5.1)

## 2019-01-11 RX ADMIN — Medication SCH ML: at 09:02

## 2019-01-11 RX ADMIN — SODIUM CHLORIDE SCH MLS: 9 INJECTION, SOLUTION INTRAVENOUS at 09:01

## 2019-01-11 RX ADMIN — OXYCODONE AND ACETAMINOPHEN PRN TAB: 5; 325 TABLET ORAL at 15:07

## 2019-01-11 NOTE — P.DS
Admission Date: 01/10/19


Discharge Date: 01/11/19


Primary Care Provider: JAYANT varela; Urology-Dr. Martins


Disposition: [facility] CARE


Discharge Condition: GOOD


Reason for Admission: UTI, hydroureter


Consultations: 





Urology-Dr. Martins





Procedures: 





CT scan: 


COMPARISON:  2016 


TECHNIQUE:  Computed axial tomography of the abdomen and pelvis was obtained. 

100 cc Isovue-300 is administered intravenously. Oral contrast was 

given.Preliminary report generated by [facility] and reviewed prior to 

dictation 


All CT scans are performed using dose optimization technique as appropriate and 

may include automated exposure control or mA/KV adjustment according to patient 

size. 


FINDINGS:


The liver, spleen, pancreas, adrenals and kidneys appear unremarkable. 

Enhancement of the wall of the right ureter. 


The appendix is not visualized. There is no evidence of diverticulitis 


Postsurgical changes involve the lumbar spine moderate amount of stool within 

the colon 


IMPRESSION:  Enhancement of the wall of the right ureter is a nonspecific 

finding but can be related to inflammation/ infection 


Moderate amount of stool within the colon


 


Renal Scan:


COMPARISON:  CT imaging January 8 


TECHNIQUE:  Patient was administered 10.5 millicuries technetium 99 M Mag 3. A 

40 milligram Lasix was given approximately 10 minutes after radiopharmaceutical 

administration. 


FINDINGS:  Dynamic flow imaging was performed. Split function assessment was 

calculated. Time activity curves were generated. 


Both kidneys show similar time activity curves. Time to peak activity on the 

left was 4.30 minutes. Time to peak activity on the right was 4.35 minutes. 

Time activity curves are normal. Split function is 46% left and 54% right. 


IMPRESSION:  Normal Lasix renogram studies. Both kidneys demonstrate normal, 

symmetric time activity curves. 


Time to peak activity on the left was 4.30 minutes. Time to peak activity on 

the right 4.35 minutes. 


Split function was 46% left and 54% right.





Medical problem list: 


Right Pyelonephritis without obstruction with positive urine culture for E coli-

ESBL


Hypothyroidism


Chronic allergic rhinitis


Chronic migraines


Brief History of Present Illness: 





43-year-old  female presented to emergency room with right flank pain. 

Patient admitted for pyelonephritis.  Urology consulted.


Hospital Course: 





Patient presented with right flank pain. Patient found to have right 

pyelonephritis.  Urology was consulted.  Workup included CT scan and renal scan 

showing no obstruction.  Urine culture was positive for E coli-ESBL.  This 

required IV antibiotic therapy.  PICC line in place.  Patient agreeable to 

continue with home health therapy to continue IV meropenem 1000 mg IV twice 

daily for 14 days.  Arrangements have been made.  Lab-BMP weekly will be 

monitored.  Patient will follow up with urology within 1 week to follow up her 

care and continue to monitor and address.  Home health to monitor closely.  

Tramadol 50 mg 1 pill 3 times a day as needed for pain will be provided.  

Recommend to recheck cath urine to monitor resolution in 2 weeks.  If negative 

PICC line can be removed.





Patient with hypothyroidism.  Patient will continue with [place] Thyroid 30 mg 

daily.





Patient with history of chronic allergic rhinitis and migraines.  Patient will 

continue with her medications.


Vital Signs/Physical Exam: 














Temp Pulse Resp BP Pulse Ox


 


 97.6 F   73   18   108/57 L  97 


 


 01/11/19 08:00  01/11/19 08:00  01/11/19 08:00  01/11/19 08:00  01/11/19 08:00








General: Alert, In no apparent distress, Oriented x3, Cooperative


HEENT: Atraumatic


Neck: Supple


Respiratory: Clear to auscultation bilaterally, Normal air movement


Cardiovascular: Normal pulses, Regular rate/rhythm


Gastrointestinal: Normal bowel sounds, Soft and benign, Non-distended, No 

tenderness, No masses, No rebound, No guarding


Musculoskeletal: No erythema, No tenderness, No warmth


Integumentary: No tenderness/swelling, No erythema, No warmth, No cyanosis


Neurological: Normal speech, Normal strength at 5/5 x4 extr, Normal tone, 

Normal affect


Laboratory Data at Discharge: 














WBC  7.8 K/uL (4.3-10.9)  D 01/09/19  05:26    


 


Hgb  14.2 g/dL (12.0-15.0)   01/09/19  05:26    


 


Hct  42.0 % (36.0-45.0)   01/09/19  05:26    


 


Plt Count  220 K/uL (152-406)   01/09/19  05:26    


 


Sodium  143 mmol/L (136-145)   01/11/19  04:50    


 


Potassium  4.1 mmol/L (3.5-5.1)   01/11/19  04:50    


 


BUN  10 mg/dL (7-18)   01/11/19  04:50    


 


Creatinine  0.81 mg/dL (0.55-1.3)   01/11/19  04:50    


 


Glucose  90 mg/dL ()   01/11/19  04:50    


 


Magnesium  2.3 mg/dL (1.8-2.4)   01/11/19  04:50    


 


Total Bilirubin  0.3 mg/dL (0.2-1.0)   01/07/19  23:25    


 


AST  85 U/L (15-37)  H  01/07/19  23:25    


 


ALT  65 U/L (12-78)   01/07/19  23:25    


 


Alkaline Phosphatase  76 U/L ()   01/07/19  23:25    


 


Lipase  81 U/L ()   01/07/19  23:25    








Home Medications: 








Loratadine [Claritin*] 10 mg PO DAILY 01/08/19 


Multivit with Calcium,Iron,Min [Multiple Vitamins For Women] 1 each PO DAILY 01/ 08/19 


Oxycodone HCl/Acetaminophen [Endocet 5-325 Tablet] 1 each PO DAILY PRN 01/08/19 


Sumatriptan [Imitrex*] 50 mg PO PRN PRN 01/08/19 


Thyroid Tab [[place] Thyroid*] 30 mg PO DAILY 01/08/19 


traMADol HCL [Ultram*] 50 mg PO TID PRN #15 tab 01/11/19 





New Medications: 


traMADol HCL [Ultram*] 50 mg PO TID PRN #15 tab


 PRN Reason: Pain Mild To Moderate


Patient Discharge Instructions: 1.  Patient will need to follow up with a PCP 

within 1 week to follow up this hospitalization.  2.  Patient presented with 

right flank pain. Patient found to have right pyelonephritis.  Urology was 

consulted.  Workup included CT scan and renal scan showing no obstruction.  

Urine culture was positive for E coli-ESBL.  This required IV antibiotic 

therapy.  PICC line in place.  Patient agreeable to continue with home health 

therapy to continue IV meropenem 1000 mg IV twice daily for 14 days.  

Arrangements have been made.  Lab-BMP weekly will be monitored.  Patient will 

follow up with urology within 1 week to follow up her care and continue to 

monitor and address.  Home health to monitor closely.  Tramadol 50 mg 1 pill 3 

times a day as needed for pain will be provided.  Recommend to recheck cath 

urine to monitor resolution in 2 weeks.  If negative PICC line can be removed.  

3.  Patient with hypothyroidism.  Patient will continue with [place] Thyroid 30 

mg daily.  4.  Patient with history of chronic allergic rhinitis and migraines.

  Patient will continue with her medications.


Diet: AHA


Activity: Ad derian


Time spent managing pt's care (in minutes): 55

## 2019-01-11 NOTE — PN
Subjective:  The patient is doing well.  She just took a shower this morning and would like to go dane
e.



Objective:  She had her PICC line in place.  Chest x-ray was good.  Line is in the superior vena cava
.  She is afebrile, stable.  



Her GFR is 76.



Assessment:  Extended-spectrum beta-lactamase infection.  The patient on IV meropenem twice a day.  P
noble to go home on IV meropenem for 14 days total.  The patient would rather do at home rather than go
ing to a skilled nursing care facility.





CHARLIE/KEV

DD:  01/11/2019 11:16:57Voice ID:  [id number]

DT:  01/11/2019 15:52:29Report ID:  [id number]

## 2019-01-11 NOTE — RAD REPORT
EXAM DESCRIPTION:  RAD - Chest Single View - 1/10/2019 10:11 pm

 

CLINICAL HISTORY:   Device placement PICC line placement

 

FINDINGS:   A PICC line has been inserted with its tip in the mid superior vena cava.

 

The lungs appear clear of acute infiltrate. The heart is normal size.

 

IMPRESSION:   PICC line with its tip in the the mid superior vena cava

## 2019-07-18 ENCOUNTER — HOSPITAL ENCOUNTER (EMERGENCY)
Dept: HOSPITAL 97 - ER | Age: 44
Discharge: NURSING, REHAB OR LONG-TERM CARE | End: 2019-07-18
Payer: COMMERCIAL

## 2019-07-18 VITALS — OXYGEN SATURATION: 99 % | SYSTOLIC BLOOD PRESSURE: 113 MMHG | DIASTOLIC BLOOD PRESSURE: 64 MMHG

## 2019-07-18 VITALS — TEMPERATURE: 98.2 F

## 2019-07-18 DIAGNOSIS — R33.9: Primary | ICD-10-CM

## 2019-07-18 DIAGNOSIS — K21.9: ICD-10-CM

## 2019-07-18 DIAGNOSIS — E03.9: ICD-10-CM

## 2019-07-18 DIAGNOSIS — G43.909: ICD-10-CM

## 2019-07-18 DIAGNOSIS — G82.22: ICD-10-CM

## 2019-07-18 PROCEDURE — 99285 EMERGENCY DEPT VISIT HI MDM: CPT

## 2019-07-18 PROCEDURE — 0T9B70Z DRAINAGE OF BLADDER WITH DRAINAGE DEVICE, VIA NATURAL OR ARTIFICIAL OPENING: ICD-10-PCS

## 2019-07-18 PROCEDURE — 96374 THER/PROPH/DIAG INJ IV PUSH: CPT

## 2019-07-18 PROCEDURE — 96361 HYDRATE IV INFUSION ADD-ON: CPT

## 2019-07-18 PROCEDURE — 51702 INSERT TEMP BLADDER CATH: CPT

## 2019-07-18 NOTE — XMS REPORT
Clinical Summary

 Created on:2019



Patient:Velma Juarez

Sex:Female

:1975

External Reference #:[id number]





Demographics







 Address    [address]



   [place] [address]

 

 Home Phone  [phone]

 

 Email Address  [email or web address]

 

 Preferred Language  English

 

 Marital Status  

 

 Moravian Affiliation  Unknown

 

 Race  White

 

 Ethnic Group  Not  or 









Author







 Organization  [facility]

 

 Address  [address] [place] [address]









Support







 Name  Relationship  Address  Phone

 

 Solitario Juarez  Spouse  2   [phone]



     [place] [address]  









Care Team Providers







 Name  Role  Phone

 

 Asked, No Pcp  Primary Care Provider  Unavailable









Allergies

Not on File



Medications







 Medication  Sig  Dispensed  Refills  Start Date  End Date  Status

 

 cyclobenzaprine  Take 10 mg by    3  2016    Active



 (FLEXERIL) 10 mg tablet  mouth 3 (three)          



   times a day as          



   needed.          

 

 acetaminophen-codeine  Take 1 tablet    0  10/10/2016    Active



 (TYLENOL WITH CODEINE  by mouth every          



 #3) 300-30 mg per  6 (six) hours          



 tablet  as needed. for          



   pain          

 

 docusate sodium  1 CAPSULE AS    0  10/04/2016    Active



 (COLACE) 100 MG capsule  NEEDED ONCE A          



   DAY ORALLY 30          



   DAY(S)          

 

 HYDROcodone-acetaminoph  Take 1 tablet    0  2016    Active



 en (NORCO)  mg  by mouth every          



 per tablet  6 (six) hours          



   as needed. for          



   pain          

 

 omeprazole (PriLOSEC)  Take 40 mg by    0  10/10/2016    Active



 40 MG capsule  mouth once          



   daily.          

 

 QSYMIA 7.5-46 mg  Take 1 tablet    2  2016    Active



 capsule, ER multiphase  by mouth once          



 24 hr  daily.          

 

 tiZANidine (ZANAFLEX) 4  Take 4 mg by    0  10/10/2016    Active



 MG tablet  mouth every 12          



   (twelve) hours.          

 

 venlafaxine XR  Take 37.5 mg by    2  10/28/2016    Active



 (EFFEXOR-XR) 37.5 MG 24  mouth once          



 hr capsule  daily.          

 

 gabapentin (NEURONTIN)  TAKE 2 CAPSULES  200 capsule  1  2017    Active



 300 mg capsule  BY MOUTH 3          



   TIMES A DAY          







Active Problems







 Problem  Noted Date

 

 Brachial neuritis  2016

 

 Abnormal finding of trunk  2016

 

 Chronic bilateral low back pain without sciatica  2016

 

 Chronic pain syndrome  2016







Social History







 Tobacco Use  Types  Packs/Day  Years Used  Date

 

 Never Assessed        









 Sex Assigned at Birth  Date Recorded

 

 Not on file  









 Job Start Date  Occupation  Industry

 

 Not on file  Not on file  Not on file









 Travel History  Travel Start  Travel End









 No recent travel history available.







Last Filed Vital Signs

Not on file



Plan of Treatment







 Health Maintenance  Due Date  Last Done  Comments

 

 INFLUENZA VACCINE  2019    







Results

Not on fileafter 2018



Insurance







 Payer  Benefit Plan / Group  Subscriber ID  Effective Dates  Phone  Address  
Type

 

 [employer]  [employer] HMO,POS,EPO, [employer]/EC  xxxxxxxxx  2003-Present      HMO









 Guarantor Name  Account Type  Relation to  Date of Birth  Phone  Billing



     Patient      Address

 

 Velma Juarez  Personal/Family  Self  1975  [phone]







         (Home)  [place]



           [address]







Advance Directives

Patient has advance care planning documents on file. For more information, 
please contact:[address] [place] [address]

## 2019-07-18 NOTE — EDPHYS
Physician Documentation                                                                           

 [facility]                                                                 

Name: Velma Juarez                                                                                  

Age: 43 yrs                                                                                       

Sex: Female                                                                                       

: 1975                                                                                   

MRN: [id number]                                                                                   

Arrival Date: 2019                                                                          

Time: 07:08                                                                                       

Account#: [id number]                                                                            

Bed 13                                                                                            

Private MD:                                                                                       

ED Physician Se Mcginnis                                                                       

HPI:                                                                                              

                                                                                             

13:23 This 43 yrs old  Female presents to ER via Wheelchair with complaints of       gs  

      Urinary Retention, Post Surgical Problem.                                                   

13:23 Onset: The symptoms/episode began/occurred this morning, at 03:00. Associated signs and gs  

      symptoms: Pertinent positives: bladder incontinence, numbness, weakness. The problem        

      was sustained recent back procedure, weakness numbness has essentially resolved except      

      tingling knee to waist urinary retention. Severity of symptoms: At their worst the          

      symptoms were incapacitating, in the emergency department the symptoms have improved,       

      markedly. The patient has not experienced similar symptoms in the past.                     

                                                                                                  

OB/GYN:                                                                                           

07:13 LMP N/A - Hysterectomy                                                                  rb1 

                                                                                                  

Historical:                                                                                       

- Allergies:                                                                                      

07:34 No Known Allergies;                                                                     iw  

- Home Meds:                                                                                      

:13 Diazepam Oral [Active];                                                                 rb1 

- PMHx:                                                                                           

:34 GERD; Hypothyroidism; Migraines;                                                        iw  

- PSHx:                                                                                           

07:34 partial hysterectomy; Anterior lumbar fusion;                                           iw  

                                                                                                  

- Immunization history:: Adult Immunizations up to date.                                          

- Ebola Screening: : Patient negative for fever greater than or equal to 101.5 degrees            

  Fahrenheit, and additional compatible Ebola Virus Disease symptoms Patient denies               

  exposure to infectious person Patient denies travel to an Ebola-affected area in the            

  21 days before illness onset No symptoms or risks identified at this time.                      

- Social history:: Smoking status: Patient uses tobacco products, denies chronic                  

  smoking, but will smoke occasionally.                                                           

                                                                                                  

                                                                                                  

ROS:                                                                                              

13:28 All other systems are negative.                                                         gs  

                                                                                                  

Exam:                                                                                             

13:28 Head/Face:  Normocephalic, atraumatic. Eyes:  Pupils equal round and reactive to light, gs  

      extra-ocular motions intact.  Lids and lashes normal.  Conjunctiva and sclera are           

      non-icteric and not injected.  Cornea within normal limits.  Periorbital areas with no      

      swelling, redness, or edema. ENT:  Nares patent. No nasal discharge, no septal              

      abnormalities noted.  Tympanic membranes are normal and external auditory canals are        

      clear.  Oropharynx with no redness, swelling, or masses, exudates, or evidence of           

      obstruction, uvula midline.  Mucous membranes moist. Neck:  Trachea midline, no             

      thyromegaly or masses palpated, and no cervical lymphadenopathy.  Supple, full range of     

      motion without nuchal rigidity, or vertebral point tenderness.  No Meningismus.             

      Chest/axilla:  Normal chest wall appearance and motion.  Nontender with no deformity.       

      No lesions are appreciated. Cardiovascular:  Regular rate and rhythm with a normal S1       

      and S2.  No gallops, murmurs, or rubs.  Normal PMI, no JVD.  No pulse deficits.             

      Respiratory:  Lungs have equal breath sounds bilaterally, clear to auscultation and         

      percussion.  No rales, rhonchi or wheezes noted.  No increased work of breathing, no        

      retractions or nasal flaring. Abdomen/GI:  Soft, non-tender, with normal bowel sounds.      

      No distension or tympany.  No guarding or rebound.  No evidence of tenderness               

      throughout. Back:  No spinal tenderness.  No costovertebral tenderness.  Full range of      

      motion.                                                                                     

13:28 Constitutional: The patient appears alert, awake.                                           

13:28 : Bladder: distension, that is severe.                                                    

13:28 Neuro: Sensation: pin prick is decreased in the  left quadriceps.                           

13:28 Neuro: Motor: moves all fours, strength is 4/5 in the  right leg and left leg,              

      Sensation: 2 point discrimination is decreased in the  right quadriceps, Deep tendon        

      reflexes are 3+ (brisk) in the  right patellar, right Achilles, left patellar and left      

      Achilles.                                                                                   

13:34 Neuro: Sensation: pin prick is decreased in the  left quadriceps and right quadriceps.  gs  

                                                                                                  

Vital Signs:                                                                                      

07:13  / 54; Pulse 66; Resp 17; Temp 98.0(O); Pulse Ox 98% on R/A; Weight 77.11 kg (R); rb1 

      Height 5 ft. 4 in. (162.56 cm) (R); Pain 10/10;                                             

08:13  / 55; Pulse 64; Resp 16; Pulse Ox 99% on R/A; Pain 10/10;                        rb1 

09:13  / 52; Pulse 82; Resp 16; Temp 98.3(O); Pulse Ox 99% on R/A; Pain 10/10;          rb1 

10:10  / 66; Pulse 53; Resp 16; Temp 98.1(O); Pulse Ox 98% on R/A; Pain 10/10;          rb1 

11:00  / 51; Pulse 59; Resp 17; Temp 98.2; Pulse Ox 100% on R/A; Pain 10/10;            rb1 

12:00  / 60; Pulse 62; Resp 16; Temp 98.2(O); Pulse Ox 100% on R/A;                     rb1 

13:02  / 64; Pulse 64; Resp 16; Temp 98.2(O); Pulse Ox 99% on R/A;                      mh5 

07:13 Body Mass Index 29.18 (77.11 kg, 162.56 cm)                                             rb1 

                                                                                                  

MDM:                                                                                              

07:28 Patient medically screened.                                                               

13:35 Data reviewed: vital signs, nurses notes. Response to treatment: There is no              

      appreciated change of the patient's symptoms at this time. ED course: failed after          

      removed shin will transfer to .                                                          

                                                                                                  

                                                                                             

07:29 Order name: Shin; Complete Time: 07:44                                                   

                                                                                             

07:29 Order name: IV; Complete Time: 08:56                                                      

                                                                                             

08:13 Order name: Misc. Order: discontinue shin; Complete Time: 09:50                          

                                                                                             

12:30 Order name: Shin; Complete Time: 12:55                                                   

                                                                                                  

Administered Medications:                                                                         

08:55 Drug: NS 0.9% 1000 ml Route: IV; Rate: 1 bolus; Site: left antecubital;                 rb1 

10:05 Follow up: IV Status: Completed infusion                                                rb1 

08:56 Drug: Decadron - Dexamethasone 10 mg Route: IVP; Site: left antecubital;                rb1 

09:11 Follow up: Response: No adverse reaction                                                [facility] 

                                                                                                  

                                                                                                  

Disposition:                                                                                      

19 11:29 Transfer ordered to Other Acute Care Facility. Diagnosis are Paraplegia,           

  incomplete, Retention of urine.                                                                 

- Reason for transfer: Higher level of care.                                                      

- Accepting physician is rudy.                                                                   

- Condition is Stable.                                                                            

- Problem is new.                                                                                 

- Symptoms have improved.                                                                         

                                                                                                  

                                                                                                  

                                                                                                  

Signatures:                                                                                       

Jennifer Santiago RN                     JORGE A                                                      

Debora Corrales RN                     RN   rb1                                                  

Se Mcginnis MD MD                                                      

                                                                                                  

Corrections: (The following items were deleted from the chart)                                    

13:35 13:28 Neuro: Motor: moves all fours, strength is 4/5 in the right leg and left leg,       

      Sensation: 2 point discrimination is decreased in the right quadriceps, Deep tendon         

      reflexes are 3+ (brisk) in the right patellar, right Achilles, left patellar and left       

      Achilles,                                                                                 

13:47 11:29 2019 11:29 Transfer ordered to Other Acute Care Facility. Diagnosis is      rb1 

      Paraplegia, incomplete; Retention of urine. Reason for transfer: Higher level of care.      

      Accepting physician is rudy. Condition is Stable. Problem is new. Symptoms have            

      improved. gs                                                                                

                                                                                                  

**************************************************************************************************

## 2019-07-18 NOTE — ER
Nurse's Notes                                                                                     

 [facility]                                                                 

Name: Velma Juarez                                                                                  

Age: 43 yrs                                                                                       

Sex: Female                                                                                       

: 1975                                                                                   

MRN: [id number]                                                                                   

Arrival Date: 2019                                                                          

Time: 07:08                                                                                       

Account#: [id number]                                                                            

Bed 13                                                                                            

Private MD:                                                                                       

Diagnosis: Paraplegia, incomplete;Retention of urine                                              

                                                                                                  

Presentation:                                                                                     

                                                                                             

07:26 Presenting complaint: Patient states: had spinal cord stimulator placed yesterday by    iw  

      Dr. Ruano at [facility], has hx of lumbar fusion that causes chronic          

      pain, pt states she has not been able to urinate since 1 pm yesterday after surgery,        

      states she couldn't move her legs yesterday when she got home, she has regained feeling     

      in her legs from her knees to her toes but she still can't feel anything from her knees     

      to her upper abdomen, is also still in a lot of surgical pain. Transition of care:          

      patient was not received from another setting of care. Onset of symptoms was 2019. Risk Assessment: Do you want to hurt yourself or someone else? Patient reports no     

      desire to harm self or others. Initial Sepsis Screen: Does the patient meet any 2           

      criteria? No. Patient's initial sepsis screen is negative. Does the patient have a          

      suspected source of infection? No. Patient's initial sepsis screen is negative. Care        

      prior to arrival: None.                                                                     

07:26 Method Of Arrival: Wheelchair                                                           iw  

07:26 Acuity: MAGALIS 2                                                                           iw  

07:32 Note pt was able to transfer from her wheelchair to stretcher with assistance.          iw  

                                                                                                  

OB/GYN:                                                                                           

07:13 LMP N/A - Hysterectomy                                                                  rb1 

                                                                                                  

Historical:                                                                                       

- Allergies:                                                                                      

07:34 No Known Allergies;                                                                     iw  

- Home Meds:                                                                                      

07:13 Diazepam Oral [Active];                                                                 rb1 

- PMHx:                                                                                           

07:34 GERD; Hypothyroidism; Migraines;                                                        iw  

- PSHx:                                                                                           

07:34 partial hysterectomy; Anterior lumbar fusion;                                           iw  

                                                                                                  

- Immunization history:: Adult Immunizations up to date.                                          

- Ebola Screening: : Patient negative for fever greater than or equal to 101.5 degrees            

  Fahrenheit, and additional compatible Ebola Virus Disease symptoms Patient denies               

  exposure to infectious person Patient denies travel to an Ebola-affected area in the            

  21 days before illness onset No symptoms or risks identified at this time.                      

- Social history:: Smoking status: Patient uses tobacco products, denies chronic                  

  smoking, but will smoke occasionally.                                                           

                                                                                                  

                                                                                                  

Screenin:13 Abuse screen: Denies threats or abuse. Nutritional screening: No deficits noted.        rb1 

      Tuberculosis screening: No symptoms or risk factors identified. Fall Risk No fall in        

      past 12 months (0 pts). Secondary diagnosis (15 points) impaired mobility, No IV (0         

      pts). Ambulatory Aid- Crutches/Cane/Walker (15 pts). Gait- Impaired (20 pts.). Mental       

      Status- Oriented to own ability (0 pts). Total Blackmon Fall Scale indicates High Risk         

      Score (45 or more points). Fall prevention measures have been instituted. Side Rails Up     

      X 2 Placed Close to Nursing Station 1:1 Attendant Assigned Frequent Obs/Assessments         

      Occuring Family Present and informed to notify staff if the need to leave the bedside       

      As available patient and family educated on Fall Prevention Program and Strategies.         

                                                                                                  

Assessment:                                                                                       

07:13 General: Appears uncomfortable, Behavior is calm, cooperative. Pain: Complains of pain  rb1 

      in back Pain currently is 10 out of 10 on a pain scale. Neuro: Level of Consciousness       

      is awake, alert, obeys commands, Oriented to person, place, time, situation, Reports        

      numbness in Groin to knees Tingling from knees to Toes in bilateral legs.                   

      Cardiovascular: Capillary refill < 3 seconds is brisk in bilateral toes. Respiratory:       

      Airway is patent Respiratory effort is even, unlabored, Respiratory pattern is regular,     

      symmetrical. GI: No signs and/or symptoms were reported involving the gastrointestinal      

      system. : Reports inability to void. Derm: Skin is pink, warm \T\ dry. Musculoskeletal:   

      Range of motion: intact in all extremities, Reports numbness in right leg and left leg.     

08:13 Reassessment: Patient appears in no apparent distress at this time. No changes from     rb1 

      previously documented assessment. Family at bedside.                                        

09:00 Reassessment: Patient appears in no apparent distress at this time. Patient and/or      rb1 

      family updated on plan of care and expected duration. Pain level reassessed. Patient is     

      alert, oriented x 3, equal unlabored respirations, skin warm/dry/pink.                      

09:45 Reassessment: Patient appears in no apparent distress at this time. No changes from     rb1 

      previously documented assessment. Discontinued Dumont Catheter, pt. tolerated well.          

10:56 Reassessment: Pt. ambulated to the restroom without complications. Pt. reports feeling  rb1 

      the need to urinate but is unable to urinate at this time. Dr. Mcginnis notified.              

12:00 Reassessment: Patient is alert, oriented x 3, equal unlabored respirations, skin        rb1 

      warm/dry/pink. Pain 10/10. Assisted pt. to the restroom. She was unable to urinated at      

      this time. Dr. Mcginnis notified that the pt. and family would like to talk to him about       

      the POC.                                                                                    

12:50 Reassessment: Called [phone] and gave report to JORGE A Simms at [facility]. Information  rb1 

      from the SBAR was given. All questions asked and answered.                                  

13:00 Reassessment: Patient appears in no apparent distress at this time. Patient is alert,   rb1 

      oriented x 3, equal unlabored respirations, skin warm/dry/pink. Family at bedside.          

13:40 Reassessment: Gave report to [facility].                                                 rb1 

                                                                                                  

Vital Signs:                                                                                      

07:13  / 54; Pulse 66; Resp 17; Temp 98.0(O); Pulse Ox 98% on R/A; Weight 77.11 kg (R); rb1 

      Height 5 ft. 4 in. (162.56 cm) (R); Pain 10/10;                                             

08:13  / 55; Pulse 64; Resp 16; Pulse Ox 99% on R/A; Pain 10/10;                        rb1 

09:13  / 52; Pulse 82; Resp 16; Temp 98.3(O); Pulse Ox 99% on R/A; Pain 10/10;          rb1 

10:10  / 66; Pulse 53; Resp 16; Temp 98.1(O); Pulse Ox 98% on R/A; Pain 10/10;          rb1 

11:00  / 51; Pulse 59; Resp 17; Temp 98.2; Pulse Ox 100% on R/A; Pain 10/10;            rb1 

12:00  / 60; Pulse 62; Resp 16; Temp 98.2(O); Pulse Ox 100% on R/A;                     rb1 

13:02  / 64; Pulse 64; Resp 16; Temp 98.2(O); Pulse Ox 99% on R/A;                      5 

07:13 Body Mass Index 29.18 (77.11 kg, 162.56 cm)                                             [facility] 

                                                                                                  

ED Course:                                                                                        

07:08 Patient arrived in ED.                                                                  as  

07:13 Patient has correct armband on for positive identification. Placed in gown. Bed in low  rb1 

      position. Call light in reach. Side rails up X2. Pulse ox on. NIBP on. Warm blanket         

      given. Pillow given.                                                                        

07:13 Arm band placed on right wrist.                                                         rb1 

07:18 Se Mcginnis MD is Attending Physician.                                              gs  

07:21 Debora Corrales, RN is Primary Nurse.                                                   rb1 

07:32 Triage completed.                                                                       iw  

07:40 Dumont cath inserted, using sterile technique, 16 Fr., by me, balloon inflated, to       rb1 

      gravity drainage, clamped.                                                                  

08:57 Inserted saline lock: 22 gauge in left antecubital area, using aseptic technique.       ph  

      Missed attempt(s): 20 gauge in right antecubital area. Bleeding controlled, band aid        

      applied, catheter tip intact.                                                               

09:45 Patient tolerated well. Dumont cath removed intact, balloon deflated.                    rb1 

11:12 transfer initiated with Nova at the [facility].                   

12:14 administrative approval given by Nova Gibbs RN/ patient has been accepted to    [facility] by Dr. Alden Fowler to the med/surg 3rd floor. report to be       

      called to [phone] or [phone].                                                             

12:38 Dumont cath inserted, using sterile technique, 16 Fr., by me, balloon inflated, to       rb1 

      gravity drainage, Patient tolerated well.                                                   

13:46 No provider procedures requiring assistance completed. Patient transferred, IV remains  rb1 

      in place.                                                                                   

                                                                                                  

Administered Medications:                                                                         

08:55 Drug: NS 0.9% 1000 ml Route: IV; Rate: 1 bolus; Site: left antecubital;                 rb1 

10:05 Follow up: IV Status: Completed infusion                                                rb1 

08:56 Drug: Decadron - Dexamethasone 10 mg Route: IVP; Site: left antecubital;                rb1 

09:11 Follow up: Response: No adverse reaction                                                rb1 

                                                                                                  

                                                                                                  

Output:                                                                                           

08:00 Urine: 750ml (Dumont); Total: 750ml.                                                     rb1 

                                                                                                  

Outcome:                                                                                          

11:29 ER care complete, transfer ordered by MD.                                               gs  

13:46 Transferred by ground EMS to [facility], Transfer form completed.             rb1 

13:46 Condition: stable                                                                           

13:46 Instructed on the need for transfer.                                                        

13:47 Patient left the ED.                                                                    rb1 

                                                                                                  

Signatures:                                                                                       

Lulú Chaney Irene, RN                     Sandy Ely RN                      RN   ph                                                   

Antoni, Debora, RN                     RN   rb1                                                  

Shiv, Gely                              [facility]                                                  

Se Mcginnis MD MD gs Botello, Elizabeth eb                                                   

                                                                                                  

**************************************************************************************************
Yes

## 2019-08-26 ENCOUNTER — HOSPITAL ENCOUNTER (INPATIENT)
Dept: HOSPITAL 97 - 4TH | Age: 44
LOS: 1 days | Discharge: HOME HEALTH | DRG: 690 | End: 2019-08-27
Attending: FAMILY MEDICINE | Admitting: FAMILY MEDICINE
Payer: COMMERCIAL

## 2019-08-26 VITALS — BODY MASS INDEX: 29.5 KG/M2

## 2019-08-26 DIAGNOSIS — F17.210: ICD-10-CM

## 2019-08-26 DIAGNOSIS — G89.29: ICD-10-CM

## 2019-08-26 DIAGNOSIS — Z96.89: ICD-10-CM

## 2019-08-26 DIAGNOSIS — N30.00: Primary | ICD-10-CM

## 2019-08-26 DIAGNOSIS — B96.89: ICD-10-CM

## 2019-08-26 DIAGNOSIS — Z16.12: ICD-10-CM

## 2019-08-26 DIAGNOSIS — E03.9: ICD-10-CM

## 2019-08-26 LAB
ALBUMIN SERPL BCP-MCNC: 4.1 G/DL (ref 3.4–5)
ALP SERPL-CCNC: 65 U/L (ref 45–117)
ALT SERPL W P-5'-P-CCNC: 20 U/L (ref 12–78)
AST SERPL W P-5'-P-CCNC: 12 U/L (ref 15–37)
BUN BLD-MCNC: 14 MG/DL (ref 7–18)
GLUCOSE SERPLBLD-MCNC: 105 MG/DL (ref 74–106)
HCT VFR BLD CALC: 42.4 % (ref 36–45)
LYMPHOCYTES # SPEC AUTO: 2.9 K/UL (ref 0.7–4.9)
PMV BLD: 8.9 FL (ref 7.6–11.3)
POTASSIUM SERPL-SCNC: 3.9 MMOL/L (ref 3.5–5.1)
RBC # BLD: 4.58 M/UL (ref 3.86–4.86)

## 2019-08-26 PROCEDURE — 80053 COMPREHEN METABOLIC PANEL: CPT

## 2019-08-26 PROCEDURE — 85025 COMPLETE CBC W/AUTO DIFF WBC: CPT

## 2019-08-26 PROCEDURE — 74150 CT ABDOMEN W/O CONTRAST: CPT

## 2019-08-26 PROCEDURE — 84439 ASSAY OF FREE THYROXINE: CPT

## 2019-08-26 PROCEDURE — 84443 ASSAY THYROID STIM HORMONE: CPT

## 2019-08-26 PROCEDURE — 82533 TOTAL CORTISOL: CPT

## 2019-08-26 PROCEDURE — 36415 COLL VENOUS BLD VENIPUNCTURE: CPT

## 2019-08-26 PROCEDURE — 71045 X-RAY EXAM CHEST 1 VIEW: CPT

## 2019-08-26 PROCEDURE — 87040 BLOOD CULTURE FOR BACTERIA: CPT

## 2019-08-26 RX ADMIN — SODIUM CHLORIDE SCH MLS: 0.9 INJECTION, SOLUTION INTRAVENOUS at 23:58

## 2019-08-26 RX ADMIN — ONDANSETRON PRN MG: 2 INJECTION INTRAMUSCULAR; INTRAVENOUS at 16:52

## 2019-08-26 RX ADMIN — SODIUM CHLORIDE SCH MLS: 9 INJECTION, SOLUTION INTRAVENOUS at 15:44

## 2019-08-26 RX ADMIN — Medication SCH: at 21:00

## 2019-08-26 RX ADMIN — SODIUM CHLORIDE SCH MLS: 9 INJECTION, SOLUTION INTRAVENOUS at 21:33

## 2019-08-26 RX ADMIN — SODIUM CHLORIDE SCH MLS: 0.9 INJECTION, SOLUTION INTRAVENOUS at 16:39

## 2019-08-26 NOTE — P.HP
Certification for Inpatient


Patient admitted to: Observation


With expected LOS: <2 Midnights


Patient will require the following post-hospital care: None


Practitioner: I am a practitioner with admitting privileges, knowledge of 

patient current condition, hospital course, and medical plan of care.


Services: Services provided to patient in accordance with Admission 

requirements found in Title 42 Section 412.3 of the Code of Federal Regulations





Patient History


Date of Service: 08/26/19


Reason for admission: Left Flank Pain


History of Present Illness: 





This is a 43-year-old female with significant past medical history of pain 

management who recently had a spine stimulator placed on July 17, 2019 who 

presented to the Urology Clinic complaining of having some left-sided flank 

pain.  Patient about a week ago picked up ciprofloxacin that was prescribed to 

her by the urologist for possible urinary tract infection.  Patient's urine 

culture at this time was positive for E. S. BL and thus patient was referred 

over for admission from the urology office as a direct admission to the 

hospital.  Patient stated that her left flank like mentioned before started 

about a week ago.  Patient stated that after her spinal stimulator was placed 

she had urinary retention on couple of occasions which was relieved by straight 

catheterization here in the ER and at the doctor's office in [place].  Patient 

stated that she also has been having increased in urinary frequency weeks only.

  Denies having any fever chills nausea vomiting or any other associated 

symptoms.  Patient was concerned about her pain managed and here in the 

hospital and is very concerned whether her medications will be restarted here 

in the hospital or not.


Allergies





morphine Adverse Reaction (Mild, Verified 09/15/12 14:30)


 Nausea/Vomiting





Home Medications: 








Sumatriptan [Imitrex*] 50 mg PO TID PRN 01/08/19 


Diazepam [Valium] 20 mg PO TID 08/26/19 


Naloxegol Oxalate [Movantik] 12.5 mg PO DAILY 08/26/19 


Oxycodone HCl/Acetaminophen [Oxycodone-Acetaminophen ] 1 - 2 each PO Q4H 

08/26/19 








- Past Medical/Surgical History


Has patient received pneumonia vaccine in the past: No


Diabetic: No


-: Migraines


-: Hypothyroidism


-: hysterectomy


-: 4x back surgery


-: Spinal cord stimulator





- Family History


  ** Mother


-: Hypertension, Diabetes





- Social History


Smoking Status: Current some day smoker


Alcohol use: No


CD- Drugs: No


Caffeine use: No


Place of Residence: Home





Review of Systems


10-point ROS is otherwise unremarkable





Physical Examination





- Vital Signs


Temperature: 97.4 F


Blood Pressure: 90/48


Pulse: 60


Respirations: 16


Pulse Ox (%): 100





- Physical Exam


General: Alert, In no apparent distress


HEENT: Atraumatic, PERRLA, Mucous membr. moist/pink, EOMI, Sclerae nonicteric


Neck: Supple, 2+ carotid pulse no bruit, No LAD, Without JVD or thyroid 

abnormality


Respiratory: Clear to auscultation bilaterally, Normal air movement


Cardiovascular: Regular rate/rhythm, Normal S1 S2


Gastrointestinal: Normal bowel sounds, No tenderness


Musculoskeletal: No tenderness


Integumentary: No rashes


Neurological: Normal gait, Normal speech, Normal strength at 5/5 x4 extr, 

Normal tone, Normal affect


Lymphatics: No axilla or inguinal lymphadenopathy





- Studies


Laboratory Data (last 24 hrs)





08/26/19 13:41: Sodium 142, Potassium 3.9, BUN 14, Creatinine 0.92, Glucose 105

, Total Bilirubin 0.4, AST 12 L, ALT 20, Alkaline Phosphatase 65


08/26/19 13:41: WBC 7.9, Hgb 14.3, Hct 42.4, Plt Count 233








Assessment and Plan





- Problems (Diagnosis)


(1) UTI (urinary tract infection)


Onset Date: 01/09/19   Current Visit: No   Status: Acute   


Plan: 


Patient with a UA positive for leuk esterase and bacteria in your recent urine 

culture positive for E.SBL


-started on IV meropenem at this time q.8 hr


-PICC line to be placed tonight with a PICC line nurse


-case management has been arranged to set up outpatient IV antibiotics


-possible discharge within 24-48 hr CT scan of the abdomen negative for any 

pyelonephritis


Qualifiers: 


   Urinary tract infection type: acute cystitis   Hematuria presence: without 

hematuria   Qualified Code(s): N30.00 - Acute cystitis without hematuria   





(2) Chronic pain disorder


Current Visit: Yes   Status: Chronic   


Plan: 


Patient status post spinal cord stimulator placement on July 17, 2019


-Miguel takes oxycodone at home.  However patient's blood pressure has been 

normal lower end here in the hospital.  Educated extensively regarding the need 

to hold the blood pressure and the benzodiazepines at this time until further 

improvement in her blood pressure.  Patient has been started on IV fluids here 

is well.  Will re-evaluate patient in 6hrs 











(3) Spinal cord stimulator status


Current Visit: Yes   Status: Chronic   





- Plan





Admit patient to the hospital for PICC line placement an outpatient IV 

antibiotic therapy started now the patient has been ruled out for left-sided 

pyleonephritis


Discharge Plan: Home


Plan to discharge in: Greater than 2 days





- Advance Directives


Does patient have a Living Will: No


Does patient have a Durable POA for Healthcare: No





- Code Status/Comfort Care


Code Status Assessed: Yes


Critical Care: No

## 2019-08-26 NOTE — RAD REPORT
EXAM DESCRIPTION:  CT - Abdomen Wo Contrast - 8/26/2019 2:43 pm

 

CLINICAL HISTORY:  History of ESBL, Bilateral flank pain

Abdominal pain

 

COMPARISON:  Abdomen   Pelvis W Contrast dated 1/8/2019

 

TECHNIQUE:  Axial 5 millimeter thick CT imaging of the abdomen was performed.  No IV contrast was adm
inistered.  No oral contrast.

 

All CT scans are performed using dose optimization technique as appropriate and may include automated
 exposure control or mA/KV adjustment according to patient size.

 

FINDINGS:  No suspicious findings in the lung bases.

 

The liver, spleen, and pancreas show no suspicious findings for non IV contrast imaging. Gallbladder 
and biliary tree are without suspicious finding.

 

No hydronephrosis or suspicious renal mass. Isodense masses and pyelonephritis are not excluded on a 
non IV contrast study. A punctate 1 mm calyx calcification noted mid right kidney.No adrenal abnormal
ity.

 

No dilated bowel loops or bowel wall thickening. No free air, free fluid or inflammatory stranding. N
o hernia, mass or bulky lymphadenopathy.

 

Extensive lumbar surgical changes are present only partially imaged. No acute bony finding seen.

 

Exam sensitivity is decreased when no IV contrast is administered.

 

 

IMPRESSION:  Non contrast CT abdomen imaging showing no acute finding.

## 2019-08-27 VITALS — TEMPERATURE: 97.8 F | SYSTOLIC BLOOD PRESSURE: 116 MMHG | DIASTOLIC BLOOD PRESSURE: 61 MMHG

## 2019-08-27 LAB
ALBUMIN SERPL BCP-MCNC: 3.6 G/DL (ref 3.4–5)
ALP SERPL-CCNC: 58 U/L (ref 45–117)
ALT SERPL W P-5'-P-CCNC: 20 U/L (ref 12–78)
AST SERPL W P-5'-P-CCNC: 13 U/L (ref 15–37)
BUN BLD-MCNC: 13 MG/DL (ref 7–18)
GLUCOSE SERPLBLD-MCNC: 112 MG/DL (ref 74–106)
HCT VFR BLD CALC: 41.8 % (ref 36–45)
LYMPHOCYTES # SPEC AUTO: 1.1 K/UL (ref 0.7–4.9)
PMV BLD: 9.4 FL (ref 7.6–11.3)
POTASSIUM SERPL-SCNC: 4.5 MMOL/L (ref 3.5–5.1)
RBC # BLD: 4.53 M/UL (ref 3.86–4.86)
TSH SERPL DL<=0.05 MIU/L-ACNC: 0.74 UIU/ML (ref 0.36–3.74)

## 2019-08-27 PROCEDURE — 02HV33Z INSERTION OF INFUSION DEVICE INTO SUPERIOR VENA CAVA, PERCUTANEOUS APPROACH: ICD-10-PCS

## 2019-08-27 RX ADMIN — ONDANSETRON PRN MG: 2 INJECTION INTRAMUSCULAR; INTRAVENOUS at 05:03

## 2019-08-27 RX ADMIN — Medication SCH: at 08:50

## 2019-08-27 RX ADMIN — SODIUM CHLORIDE SCH MLS: 0.9 INJECTION, SOLUTION INTRAVENOUS at 05:03

## 2019-08-27 RX ADMIN — SODIUM CHLORIDE SCH MLS: 0.9 INJECTION, SOLUTION INTRAVENOUS at 08:43

## 2019-08-27 RX ADMIN — SODIUM CHLORIDE SCH MLS: 9 INJECTION, SOLUTION INTRAVENOUS at 08:43

## 2019-08-27 NOTE — PN
Patient had IV fluid bolus, blood pressure came up to 116/61.  She has ESBL.  
She is on appropriate antibiotic IV meropenem 1 g q.12 hours.  She has had a 
CAT scan revealing no significant abnormality.  No hydronephrosis.  No 
obstruction.  She had a small tiny stone in one of the kidneys that is 
nonobstructive.



Plan:  To get a PICC line and set up home health for her.





CHARLIE/KEV

DD:  08/27/2019 12:43:48   Voice ID:  [id number]

DT:  08/27/2019 17:17:19   Report ID:  [id number]

MTDD

## 2019-08-27 NOTE — P.PN
Date of Service: 08/27/19





Patient has been hypotensive; not orthostatic; bolus IVFs as well; recheck and 

reassess; asymptomatic

## 2019-08-27 NOTE — RAD REPORT
EXAM DESCRIPTION:  RAD - Chest Single View - 8/27/2019 2:05 pm

 

CLINICAL HISTORY:  PICC line placement

 

COMPARISON:  January 10

 

FINDINGS:  Portable chest was obtained following placement of a left upper extremity PICC line. The c
atheter tip is in the distal SVC.

## 2019-08-27 NOTE — P.SSS
Patient History


Date of Service: 08/27/19


Reason for admission: Left Flank Pain


History of Present Illness: 





This is a 43-year-old female with significant past medical history of pain 

management who recently had a spine stimulator placed on July 17, 2019 who 

presented to the [facility] complaining of having some left-sided flank 

pain.  Patient about a week ago picked up ciprofloxacin that was prescribed to 

her by the urologist for possible urinary tract infection.  Patient's urine 

culture at this time was positive for E. S. BL and thus patient was referred 

over for admission from the urology office as a direct admission to the 

hospital.  Patient stated that her left flank like mentioned before started 

about a week ago.  Patient stated that after her spinal stimulator was placed 

she had urinary retention on couple of occasions which was relieved by straight 

catheterization here in the ER and at the doctor's office in [place].  Patient 

stated that she also has been having increased in urinary frequency weeks only.

  Denies having any fever chills nausea vomiting or any other associated 

symptoms.  Patient was concerned about her pain managed and here in the 

hospital and is very concerned whether her medications will be restarted here 

in the hospital or not.


Allergies





morphine Adverse Reaction (Mild, Verified 09/15/12 14:30)


 Nausea/Vomiting





Home Medications: 








RX: Sumatriptan [Imitrex*] 50 mg PO TID PRN 01/08/19 


RX: Diazepam [Valium] 20 mg PO TID 08/26/19 


RX: Naloxegol Oxalate [Movantik] 12.5 mg PO DAILY 08/26/19 


RX: Oxycodone HCl/Acetaminophen [Oxycodone-Acetaminophen ] 1 - 2 each PO 

Q4H 08/26/19 


Meropenem [Merrem] 1,000 mg IV BID #28 vial 08/27/19 








- Past Medical/Surgical History


Has patient received pneumonia vaccine in the past: No


Diabetic: No


-: Migraines


-: Hypothyroidism


-: hysterectomy


-: 4x back surgery


-: Spinal cord stimulator





- Family History


  ** Mother


-: Hypertension, Diabetes





- Social History


Smoking Status: Current some day smoker


Alcohol use: No


CD- Drugs: No


Caffeine use: No


Place of Residence: Home





Review of Systems


10-point ROS is otherwise unremarkable





Physical Examination





- Vital Signs


Temperature: 97.8 F


Blood Pressure: 116/61


Pulse: 70


Respirations: 18


Pulse Ox (%): 99





- Physical Exam


General: Alert, In no apparent distress


HEENT: Atraumatic, PERRLA, Mucous membr. moist/pink, EOMI, Sclerae nonicteric


Neck: Supple, 2+ carotid pulse no bruit, No LAD, Without JVD or thyroid 

abnormality


Respiratory: Clear to auscultation bilaterally, Normal air movement


Cardiovascular: Regular rate/rhythm, Normal S1 S2


Gastrointestinal: Normal bowel sounds, No tenderness


Musculoskeletal: No tenderness


Integumentary: No rashes


Neurological: Normal gait, Normal speech, Normal strength at 5/5 x4 extr, 

Normal tone, Normal affect


Lymphatics: No axilla or inguinal lymphadenopathy





- Studies


Laboratory Data (last 24 hrs)





08/27/19 03:48: Sodium 144, Potassium 4.5, BUN 13, Creatinine 0.88, Glucose 112 

H, Total Bilirubin 0.4, AST 13 L, ALT 20, Alkaline Phosphatase 58


08/27/19 03:48: WBC 9.4  D, Hgb 14.1, Hct 41.8, Plt Count 218


08/26/19 13:41: Sodium 142, Potassium 3.9, BUN 14, Creatinine 0.92, Glucose 105

, Total Bilirubin 0.4, AST 12 L, ALT 20, Alkaline Phosphatase 65


08/26/19 13:41: WBC 7.9, Hgb 14.3, Hct 42.4, Plt Count 233








- Diagnosis (Problem(s))


(1) UTI (urinary tract infection)


Onset Date: 01/09/19   Current Visit: No   Status: Acute   


Plan: 


Patient with a UA positive for leuk esterase and bacteria in your recent urine 

culture positive for E.SBL


-PICC line to be placed 


-case management set up outpatient IV antibiotics


-Merrem 1g q8h for 14 days 


-DC home now 





Qualifiers: 


   Urinary tract infection type: acute cystitis   Hematuria presence: without 

hematuria   Qualified Code(s): N30.00 - Acute cystitis without hematuria   





(2) Chronic pain disorder


Current Visit: Yes   Status: Chronic   


Plan: 


Patient status post spinal cord stimulator placement on July 17, 2019


-Currently takes oxycodone at home.  However patient's blood pressure has been 

lower end here in the hospital.  Educated extensively regarding the need to 

hold the blood pressure and the benzodiazepines at this time until further 

improvement in her blood pressure here and at home.  








(3) Spinal cord stimulator status


Current Visit: Yes   Status: Chronic   





- Disposition


Disposition: ROUTINE DISCHARGE


Condition: GOOD


Diet: Regular


Activity: Ad derian

## 2023-01-04 NOTE — XMS REPORT
Clinical Summary

 Created on:2019



Patient:Velma Juarez

Sex:Female

:1975

External Reference #:[id number]





Demographics







 Address    [address]



   [place] [address]

 

 Home Phone  [phone]

 

 Email Address  [email or web address]

 

 Preferred Language  English

 

 Marital Status  

 

 Mandaen Affiliation  Unknown

 

 Race  White

 

 Ethnic Group  Not  or 









Author







 Organization  [facility]

 

 Address  [address] [place] [address]









Support







 Name  Relationship  Address  Phone

 

 Solitario Juarez  Spouse     [phone]



     [place] [address]  









Care Team Providers







 Name  Role  Phone

 

 Asked, No Pcp  Primary Care Provider  Unavailable









Allergies

Not on File



Medications







 Medication  Sig  Dispensed  Refills  Start Date  End Date  Status

 

 cyclobenzaprine  Take 10 mg by    3  2016    Active



 (FLEXERIL) 10 mg tablet  mouth 3 (three)          



   times a day as          



   needed.          

 

 acetaminophen-codeine  Take 1 tablet    0  10/10/2016    Active



 (TYLENOL WITH CODEINE  by mouth every          



 #3) 300-30 mg per  6 (six) hours          



 tablet  as needed. for          



   pain          

 

 docusate sodium  1 CAPSULE AS    0  10/04/2016    Active



 (COLACE) 100 MG capsule  NEEDED ONCE A          



   DAY ORALLY 30          



   DAY(S)          

 

 HYDROcodone-acetaminoph  Take 1 tablet    0  2016    Active



 en (NORCO)  mg  by mouth every          



 per tablet  6 (six) hours          



   as needed. for          



   pain          

 

 omeprazole (PriLOSEC)  Take 40 mg by    0  10/10/2016    Active



 40 MG capsule  mouth once          



   daily.          

 

 QSYMIA 7.5-46 mg  Take 1 tablet    2  2016    Active



 capsule, ER multiphase  by mouth once          



 24 hr  daily.          

 

 tiZANidine (ZANAFLEX) 4  Take 4 mg by    0  10/10/2016    Active



 MG tablet  mouth every 12          



   (twelve) hours.          

 

 venlafaxine XR  Take 37.5 mg by    2  10/28/2016    Active



 (EFFEXOR-XR) 37.5 MG 24  mouth once          



 hr capsule  daily.          

 

 gabapentin (NEURONTIN)  TAKE 2 CAPSULES  200 capsule  1  2017    Active



 300 mg capsule  BY MOUTH 3          



   TIMES A DAY          







Active Problems







 Problem  Noted Date

 

 Brachial neuritis  2016

 

 Abnormal finding of trunk  2016

 

 Chronic bilateral low back pain without sciatica  2016

 

 Chronic pain syndrome  2016







Social History







 Tobacco Use  Types  Packs/Day  Years Used  Date

 

 Never Assessed        









 Sex Assigned at Birth  Date Recorded

 

 Not on file  









 Job Start Date  Occupation  Industry

 

 Not on file  Not on file  Not on file









 Travel History  Travel Start  Travel End









 No recent travel history available.







Last Filed Vital Signs

Not on file



Plan of Treatment







 Health Maintenance  Due Date  Last Done  Comments

 

 CERVICAL CANCER SCREENING  10/15/1994    

 

 INFLUENZA VACCINE  2019    







Results

Not on fileafter 2018



Insurance







 Payer  Benefit Plan / Group  Subscriber ID  Effective Dates  Phone  Address  
Type

 

 [employer]  [employer] HMO,POS,EPO, [employer]/EC  xxxxxxxxx  2003-Present      HMO









 Guarantor Name  Account Type  Relation to  Date of Birth  Phone  Billing



     Patient      Address

 

 Velma Juarez  Personal/Family  Self  1975  [phone] [address]







         (Home)  [place]



           [address]







Advance Directives

For more information, please contact: [phone]





 Type  Date Recorded  Patient Representative  Explanation

 

 Advance Directives, Living Will and      



 Medical Power of 
DASH Diet/Consistent Carbohydrate Diabetic Diets/Minced and Moist Diet